# Patient Record
Sex: MALE | Race: WHITE | NOT HISPANIC OR LATINO | Employment: FULL TIME | URBAN - METROPOLITAN AREA
[De-identification: names, ages, dates, MRNs, and addresses within clinical notes are randomized per-mention and may not be internally consistent; named-entity substitution may affect disease eponyms.]

---

## 2017-01-06 ENCOUNTER — OFFICE VISIT (OUTPATIENT)
Dept: URGENT CARE | Facility: CLINIC | Age: 37
End: 2017-01-06
Payer: COMMERCIAL

## 2017-01-06 VITALS
SYSTOLIC BLOOD PRESSURE: 120 MMHG | HEIGHT: 69 IN | WEIGHT: 180 LBS | BODY MASS INDEX: 26.66 KG/M2 | RESPIRATION RATE: 16 BRPM | HEART RATE: 82 BPM | DIASTOLIC BLOOD PRESSURE: 66 MMHG | TEMPERATURE: 99 F | OXYGEN SATURATION: 97 %

## 2017-01-06 DIAGNOSIS — H10.32 ACUTE BACTERIAL CONJUNCTIVITIS OF LEFT EYE: ICD-10-CM

## 2017-01-06 PROCEDURE — 99203 OFFICE O/P NEW LOW 30 MIN: CPT | Performed by: FAMILY MEDICINE

## 2017-01-06 RX ORDER — CIPROFLOXACIN HYDROCHLORIDE 3.5 MG/ML
1 SOLUTION/ DROPS TOPICAL 4 TIMES DAILY
Qty: 1 BOTTLE | Refills: 0 | Status: SHIPPED | OUTPATIENT
Start: 2017-01-06 | End: 2017-01-13

## 2017-01-06 NOTE — MR AVS SNAPSHOT
"        Tristen Palmer   2017 9:15 AM   Office Visit   MRN: [id number]    Department:  [facility]   Dept Phone:  [phone]    Description:  Male : 1980   Provider:  Meme Smallwood D.O.           Reason for Visit     Eye Problem left eye redness, drainage x 2 days      Allergies as of 2017     No Known Allergies      You were diagnosed with     Acute bacterial conjunctivitis of left eye   [[id number]]         Vital Signs     Blood Pressure Pulse Temperature Respirations Height Weight    120/66 mmHg 82 37.2 °C (99 °F) 16 1.753 m (5' 9.02\") 81.647 kg (180 lb)    Body Mass Index Oxygen Saturation Smoking Status             26.57 kg/m2 97% Never Smoker          Basic Information     Date Of Birth Sex Race Ethnicity Preferred Language    1980 Male White Non- English      Your appointments     2017 11:00 AM   New Patient with Benito Sales PA-C   [facility])    [address]  [address]  [place] [address]   [phone]           Please bring Photo ID, Insurance Cards, All Medication Bottles and copies of any legal documents (such as Living Will, Power of ) If speaking a language besides English please bring an adult . Please arrive 30 minutes prior for check in and registration. You will be receiving a confirmation call a few days before your appointment from our automated call confirmation system.              Health Maintenance     Patient has no pending health maintenance at this time      Current Immunizations     No immunizations on file.      Below and/or attached are the medications your provider expects you to take. Review all of your home medications and newly ordered medications with your provider and/or pharmacist. Follow medication instructions as directed by your provider and/or pharmacist. Please keep your medication list with you and share with your provider. Update the information when " medications are discontinued, doses are changed, or new medications (including over-the-counter products) are added; and carry medication information at all times in the event of emergency situations     Allergies:  No Known Allergies          Medications  Valid as of: January 06, 2017 -  9:51 AM    Generic Name Brand Name Tablet Size Instructions for use    Ciprofloxacin HCl (Solution) CILOXIN 0.3 % Place 1 Drop in both eyes 4 times a day for 7 days.        .                 Medicines prescribed today were sent to:     [facility][id number] - [place], [place] - [address]    [address] [place] [address]    Phone: [phone] Fax: [phone]    Open 24 Hours?: No      Medication refill instructions:       If your prescription bottle indicates you have medication refills left, it is not necessary to call your provider’s office. Please contact your pharmacy and they will refill your medication.    If your prescription bottle indicates you do not have any refills left, you may request refills at any time through one of the following ways: The online Ortho-tag system (except Urgent Care), by calling your provider’s office, or by asking your pharmacy to contact your provider’s office with a refill request. Medication refills are processed only during regular business hours and may not be available until the next business day. Your provider may request additional information or to have a follow-up visit with you prior to refilling your medication.   *Please Note: Medication refills are assigned a new Rx number when refilled electronically. Your pharmacy may indicate that no refills were authorized even though a new prescription for the same medication is available at the pharmacy. Please request the medicine by name with the pharmacy before contacting your provider for a refill.           Ortho-tag Access Code: Activation code not generated  Current Ortho-tag Status: Active

## 2017-01-06 NOTE — PROGRESS NOTES
"HPI: Tristen Palmer is a 36 y.o. male who presents with   Chief Complaint   Patient presents with   • Eye Problem     left eye redness, drainage x 2 days      Patient has had 2 days of left eye redness and mucous discharge. He denies any trauma does not wear contact lenses. Denies any significant cough or nasal congestion no fevers. Denies any visual disturbances.  Worsened by: activity, laying supine at night, first thing in the morning, when exposed to outside allergens  Improved by: OTC symptomatic medictions      PMH:  has no past medical history on file.  MEDS:   Current outpatient prescriptions:   •  ciprofloxacin (CILOXIN) 0.3 % Solution, Place 1 Drop in both eyes 4 times a day for 7 days., Disp: 1 Bottle, Rfl: 0  ALLERGIES: No Known Allergies  SURGHX: No past surgical history on file.  SOCHX:    FH: Family history was reviewed, no pertinent findings to report    PE:  Vitals /66 mmHg  Pulse 82  Temp(Src) 37.2 °C (99 °F)  Resp 16  Ht 1.753 m (5' 9.02\")  Wt 81.647 kg (180 lb)  BMI 26.57 kg/m2  SpO2 97%   Gen AOx4, NAD  HEENT: moist mucus membranes, no pain or pressure with percussion of frontal, maxillary or ethmoid sinuses.  Left conjunciva with erythema and d/c right clear without erythema or exudate,  Bilateral TM's clear without bulge, fluid or loss of landmarks, no pharyngeal erythema or tonsillar exudate or tonsillar enlargement  Neck: supple, no cervical lymphadenopathy, no signs of menigismus  CV/PULM: RRR no murmurs, no rales ronchi or wheezes, no signs of resp distress  Abd soft nontender, bs present  Skin no rashes  Extremities -c/c/e  Neuro appropriate affect,       A/P  The encounter diagnosis was Acute bacterial conjunctivitis of left eye.  I am having Mr. Palmer start on ciprofloxacin.     "

## 2017-01-27 ENCOUNTER — APPOINTMENT (OUTPATIENT)
Dept: MEDICAL GROUP | Facility: MEDICAL CENTER | Age: 37
End: 2017-01-27
Payer: COMMERCIAL

## 2017-02-10 ENCOUNTER — OFFICE VISIT (OUTPATIENT)
Dept: MEDICAL GROUP | Facility: MEDICAL CENTER | Age: 37
End: 2017-02-10
Payer: COMMERCIAL

## 2017-02-10 VITALS
DIASTOLIC BLOOD PRESSURE: 76 MMHG | TEMPERATURE: 98.4 F | HEIGHT: 68 IN | RESPIRATION RATE: 14 BRPM | HEART RATE: 68 BPM | BODY MASS INDEX: 27.34 KG/M2 | OXYGEN SATURATION: 96 % | SYSTOLIC BLOOD PRESSURE: 110 MMHG | WEIGHT: 180.4 LBS

## 2017-02-10 DIAGNOSIS — R53.83 OTHER FATIGUE: ICD-10-CM

## 2017-02-10 DIAGNOSIS — Z00.00 PREVENTATIVE HEALTH CARE: ICD-10-CM

## 2017-02-10 DIAGNOSIS — F33.8 SEASONAL AFFECTIVE DISORDER (HCC): ICD-10-CM

## 2017-02-10 DIAGNOSIS — R39.15 URINARY URGENCY: ICD-10-CM

## 2017-02-10 DIAGNOSIS — A63.0 GENITAL WARTS: ICD-10-CM

## 2017-02-10 DIAGNOSIS — B35.1 ONYCHOMYCOSIS: ICD-10-CM

## 2017-02-10 PROCEDURE — 99214 OFFICE O/P EST MOD 30 MIN: CPT | Performed by: PHYSICIAN ASSISTANT

## 2017-02-10 RX ORDER — IMIQUIMOD 12.5 MG/.25G
CREAM TOPICAL
Qty: 1 EACH | Refills: 2 | Status: SHIPPED | OUTPATIENT
Start: 2017-02-10 | End: 2017-06-05

## 2017-02-10 ASSESSMENT — PATIENT HEALTH QUESTIONNAIRE - PHQ9: CLINICAL INTERPRETATION OF PHQ2 SCORE: 1

## 2017-02-11 NOTE — ASSESSMENT & PLAN NOTE
He complains of episodic depression surrounding the winter months. He may go a week through depression. He won't feel like doing anything. His wife notices it. She recently told him to start vitamin D. He has taken 2 tablets of 5000 units. Denies any homicidal or suicidal ideations.

## 2017-02-11 NOTE — ASSESSMENT & PLAN NOTE
Complains of a one year history of noticing 2 lesions on his shaft of the penis. No lesions are not painful. Denies any penile discharge. States has been  for 5 years. He's been faithful to his wife. Denies knowing his wife has any symptoms.

## 2017-02-11 NOTE — ASSESSMENT & PLAN NOTE
This is a 36-year-old male comes in today to establish care. He has a long history of fungal infection of his toenails. Denies pain. A few years ago he declined treatment and regrets it. His dad might of had onychomycosis.

## 2017-02-11 NOTE — PROGRESS NOTES
Subjective:   Tristen Palmer is a 36 y.o. male here today to establish care and discuss several medical complaints.    Onychomycosis  This is a 36-year-old male comes in today to establish care. He has a long history of fungal infection of his toenails. Denies pain. A few years ago he declined treatment and regrets it. His dad might of had onychomycosis.    Seasonal affective disorder (CMS-[facility])  He complains of episodic depression surrounding the winter months. He may go a week through depression. He won't feel like doing anything. His wife notices it. She recently told him to start vitamin D. He has taken 2 tablets of 5000 units. Denies any homicidal or suicidal ideations.    Urinary urgency  Over the past few months he's noticed that when he has to use the restroom has to use it right away. Used to be able to control his bladder. He denies any hematuria. Denies any dysuria. No dribbling or nocturia. No polyuria. No polydipsia.    Recently began working out with a . Also has a high protein diet.  has lost 30 pounds over the past few weeks.    Genital warts  Complains of a one year history of noticing 2 lesions on his shaft of the penis. No lesions are not painful. Denies any penile discharge.  has been  for 5 years. He's been faithful to his wife. Denies knowing his wife has any symptoms.    Fatigue  Complains of slight fatigue that may be secondary to vitamin D deficiency.       Current medicines (including changes today)  Current Outpatient Prescriptions   Medication Sig Dispense Refill   • vitamin D (CHOLECALCIFEROL) 1000 UNIT Tab Take 1,000 Units by mouth every day.     • imiquimod (ALDARA) 5 % cream Apply cream thinly to area 3x weekly on alternative days and leave on 6-10 hrs.  Wash off after.  Use until disappears or until 16 wks. 1 Each 2     No current facility-administered medications for this visit.     He  has no past medical history on file.    ROS   No chest pain, no  "shortness of breath, no abdominal pain and all other systems were reviewed and are negative.       Objective:     Blood pressure 110/76, pulse 68, temperature 36.9 °C (98.4 °F), resp. rate 14, height 1.721 m (5' 7.75\"), weight 81.829 kg (180 lb 6.4 oz), SpO2 96 %. Body mass index is 27.63 kg/(m^2).   Physical Exam:  Constitutional: Alert, no distress.  Skin: Warm, dry, good turgor, no rashes in visible areas, on the great toenails and a portion of the second toenail of the right foot there is thickening of the nail bed. Not across the entire nail. On the penis shaft there are 2 raised fleshy colored lesions circular nature. Approximately 2 mm in diameter..  Eye: Equal, round and reactive, conjunctiva clear, lids normal.  ENMT: Lips without lesions, good dentition, oropharynx clear.  Neck: Trachea midline, no masses.   Lymph: No cervical or supraclavicular lymphadenopathy  Respiratory: Unlabored respiratory effort, lungs appear clear.  Cardiovascular: Regular rate and rhythm  : Circumcised male, please see description of lesions above.  Psych: Alert and oriented x3, normal affect and mood.        Assessment and Plan:   The following treatment plan was discussed    1. Onychomycosis  Chronic condition. Discussed with antifungal medications possibly causing liver damage. He does consume alcohol. Currently he declined referral to see podiatry. Advised him that if he has any change of his mind I will refer him to podiatry for the 12 week treatment course.    2. Urinary urgency  Chronic condition. Advised to continue to exercise. This may be secondary to caffeine intake. We'll perform urinalysis.  - URINALYSIS, COMPLETE    3. Seasonal affective disorder (CMS-HCC)  Chronic condition. Stable. Offered referral to behavioral health. He declined.  - VITAMIN 1,25 DIHYDROXY; Future    4. Other fatigue  We'll check vitamin D level.    5. Genital warts  New-onset condition. Prescribed Aldara as directed. Refer to dermatology. " Advised him it would not be seen dermatology for several months.  - REFERRAL TO DERMATOLOGY    6. Preventative health care  Ordered fasting labs. He will be contacted.  - CBC WITHOUT DIFFERENTIAL  - COMP METABOLIC PANEL; Future  - HEMOGLOBIN A1C; Future  - LIPID PANEL  - VITAMIN 1,25 DIHYDROXY; Future      Followup: Return if symptoms worsen or fail to improve.    Please note that this dictation was created using voice recognition software. I have made every reasonable attempt to correct obvious errors, but I expect that there are errors of grammar and possibly content that I did not discover before finalizing the note.

## 2017-02-11 NOTE — ASSESSMENT & PLAN NOTE
Over the past few months he's noticed that when he has to use the restroom has to use it right away. Used to be able to control his bladder. He denies any hematuria. Denies any dysuria. No dribbling or nocturia. No polyuria. No polydipsia.    Recently began working out with a . Also has a high protein diet. States has lost 30 pounds over the past few weeks.

## 2017-02-17 ENCOUNTER — APPOINTMENT (OUTPATIENT)
Dept: LAB | Facility: MEDICAL CENTER | Age: 37
End: 2017-02-17
Payer: COMMERCIAL

## 2017-02-24 ENCOUNTER — HOSPITAL ENCOUNTER (OUTPATIENT)
Dept: LAB | Facility: MEDICAL CENTER | Age: 37
End: 2017-02-24
Attending: PHYSICIAN ASSISTANT
Payer: COMMERCIAL

## 2017-02-24 DIAGNOSIS — F33.8 SEASONAL AFFECTIVE DISORDER (HCC): ICD-10-CM

## 2017-02-24 DIAGNOSIS — Z00.00 PREVENTATIVE HEALTH CARE: ICD-10-CM

## 2017-02-24 LAB
ALBUMIN SERPL BCP-MCNC: 4.8 G/DL (ref 3.2–4.9)
ALBUMIN/GLOB SERPL: 1.5 G/DL
ALP SERPL-CCNC: 57 U/L (ref 30–99)
ALT SERPL-CCNC: 22 U/L (ref 2–50)
ANION GAP SERPL CALC-SCNC: 6 MMOL/L (ref 0–11.9)
APPEARANCE UR: CLEAR
AST SERPL-CCNC: 17 U/L (ref 12–45)
BASOPHILS # BLD AUTO: 0.03 K/UL (ref 0–0.12)
BASOPHILS NFR BLD AUTO: 0.7 % (ref 0–1.8)
BILIRUB SERPL-MCNC: 0.6 MG/DL (ref 0.1–1.5)
BILIRUB UR QL STRIP.AUTO: NEGATIVE
BUN SERPL-MCNC: 20 MG/DL (ref 8–22)
CALCIUM SERPL-MCNC: 10.2 MG/DL (ref 8.5–10.5)
CHLORIDE SERPL-SCNC: 102 MMOL/L (ref 96–112)
CHOLEST SERPL-MCNC: 272 MG/DL (ref 100–199)
CO2 SERPL-SCNC: 31 MMOL/L (ref 20–33)
COLOR UR AUTO: NORMAL
CREAT SERPL-MCNC: 1.11 MG/DL (ref 0.5–1.4)
EOSINOPHIL # BLD: 0.06 K/UL (ref 0–0.51)
EOSINOPHIL NFR BLD AUTO: 1.4 % (ref 0–6.9)
ERYTHROCYTE [DISTWIDTH] IN BLOOD BY AUTOMATED COUNT: 40.8 FL (ref 35.9–50)
EST. AVERAGE GLUCOSE BLD GHB EST-MCNC: 105 MG/DL
GLOBULIN SER CALC-MCNC: 3.1 G/DL (ref 1.9–3.5)
GLUCOSE SERPL-MCNC: 89 MG/DL (ref 65–99)
GLUCOSE UR STRIP.AUTO-MCNC: NEGATIVE MG/DL
HBA1C MFR BLD: 5.3 % (ref 0–5.6)
HCT VFR BLD AUTO: 47.1 % (ref 42–52)
HDLC SERPL-MCNC: 60 MG/DL
HGB BLD-MCNC: 15.5 G/DL (ref 14–18)
IMM GRANULOCYTES # BLD AUTO: 0 K/UL (ref 0–0.11)
IMM GRANULOCYTES NFR BLD AUTO: 0 % (ref 0–0.9)
KETONES UR STRIP.AUTO-MCNC: NEGATIVE MG/DL
LDLC SERPL CALC-MCNC: 181 MG/DL
LEUKOCYTE ESTERASE UR QL STRIP.AUTO: NEGATIVE
LYMPHOCYTES # BLD: 1.3 K/UL (ref 1–4.8)
LYMPHOCYTES NFR BLD AUTO: 30.3 % (ref 22–41)
MCH RBC QN AUTO: 29.5 PG (ref 27–33)
MCHC RBC AUTO-ENTMCNC: 32.9 G/DL (ref 33.7–35.3)
MCV RBC AUTO: 89.5 FL (ref 81.4–97.8)
MICRO URNS: NORMAL
MONOCYTES # BLD: 0.46 K/UL (ref 0–0.85)
MONOCYTES NFR BLD AUTO: 10.7 % (ref 0–13.4)
NEUTROPHILS # BLD: 2.44 K/UL (ref 1.82–7.42)
NEUTROPHILS NFR BLD AUTO: 56.9 % (ref 44–72)
NITRITE UR QL STRIP.AUTO: NEGATIVE
NRBC # BLD AUTO: 0 K/UL
NRBC BLD-RTO: 0 /100 WBC
PH UR: 6.5 [PH]
PLATELET # BLD AUTO: 241 K/UL (ref 164–446)
PMV BLD AUTO: 8.9 FL (ref 9–12.9)
POTASSIUM SERPL-SCNC: 4.1 MMOL/L (ref 3.6–5.5)
PROT SERPL-MCNC: 7.9 G/DL (ref 6–8.2)
PROT UR QL STRIP: NEGATIVE MG/DL
RBC # BLD AUTO: 5.26 M/UL (ref 4.7–6.1)
RBC UR QL AUTO: NEGATIVE
SODIUM SERPL-SCNC: 139 MMOL/L (ref 135–145)
SP GR UR STRIP.AUTO: 1.01
TRIGL SERPL-MCNC: 156 MG/DL (ref 0–149)
WBC # BLD AUTO: 4.3 K/UL (ref 4.8–10.8)

## 2017-02-24 PROCEDURE — 81003 URINALYSIS AUTO W/O SCOPE: CPT

## 2017-02-24 PROCEDURE — 36415 COLL VENOUS BLD VENIPUNCTURE: CPT

## 2017-02-24 PROCEDURE — 85025 COMPLETE CBC W/AUTO DIFF WBC: CPT

## 2017-02-24 PROCEDURE — 80061 LIPID PANEL: CPT

## 2017-02-24 PROCEDURE — 80053 COMPREHEN METABOLIC PANEL: CPT

## 2017-02-24 PROCEDURE — 83036 HEMOGLOBIN GLYCOSYLATED A1C: CPT

## 2017-02-24 PROCEDURE — 82652 VIT D 1 25-DIHYDROXY: CPT

## 2017-02-27 ENCOUNTER — TELEPHONE (OUTPATIENT)
Dept: MEDICAL GROUP | Facility: MEDICAL CENTER | Age: 37
End: 2017-02-27

## 2017-02-27 LAB — 1,25(OH)2D3 SERPL-MCNC: 56.3 PG/ML (ref 19.9–79.3)

## 2017-02-28 NOTE — TELEPHONE ENCOUNTER
----- Message from Benito Sales PA-C sent at 2/27/2017  4:10 PM PST -----  Please contact Tristen.  Cholesterol is very high.  He should be on medication.  If he wants to eat healthier and exercise we may recheck in 2-3 months otherwise he'll need to start cholesterol medication. Give him those options.  Thank you.    Benito

## 2017-02-28 NOTE — TELEPHONE ENCOUNTER
Phone Number Called: [phone] (home)     Message: Left message for patient to call back regarding lab results.    Left Message for patient to call back: yes

## 2017-03-01 NOTE — TELEPHONE ENCOUNTER
Phone Number Called: [phone]     Message: Left message for patient to call back regarding lab results    Left Message for patient to call back: yes and no

## 2017-03-06 NOTE — TELEPHONE ENCOUNTER
Phone Number Called: [phone]     Message: Left message for patient about lab work.     Left Message for patient to call back: N\A

## 2017-06-05 ENCOUNTER — OFFICE VISIT (OUTPATIENT)
Dept: MEDICAL GROUP | Facility: MEDICAL CENTER | Age: 37
End: 2017-06-05
Payer: COMMERCIAL

## 2017-06-05 VITALS
TEMPERATURE: 98.5 F | DIASTOLIC BLOOD PRESSURE: 74 MMHG | HEART RATE: 88 BPM | WEIGHT: 182.1 LBS | HEIGHT: 68 IN | SYSTOLIC BLOOD PRESSURE: 120 MMHG | BODY MASS INDEX: 27.6 KG/M2 | RESPIRATION RATE: 16 BRPM | OXYGEN SATURATION: 97 %

## 2017-06-05 DIAGNOSIS — E78.00 PURE HYPERCHOLESTEROLEMIA: ICD-10-CM

## 2017-06-05 DIAGNOSIS — A63.0 GENITAL WARTS: ICD-10-CM

## 2017-06-05 DIAGNOSIS — D22.9 ATYPICAL NEVUS: ICD-10-CM

## 2017-06-05 DIAGNOSIS — F33.8 SEASONAL AFFECTIVE DISORDER (HCC): ICD-10-CM

## 2017-06-05 PROBLEM — R39.15 URINARY URGENCY: Status: RESOLVED | Noted: 2017-02-10 | Resolved: 2017-06-05

## 2017-06-05 PROBLEM — R53.83 FATIGUE: Status: RESOLVED | Noted: 2017-02-10 | Resolved: 2017-06-05

## 2017-06-05 PROCEDURE — 99214 OFFICE O/P EST MOD 30 MIN: CPT | Performed by: PHYSICIAN ASSISTANT

## 2017-06-05 NOTE — ASSESSMENT & PLAN NOTE
He has been followed by dermatology. Aldara was not effective. It one treatment has been helpful in reduction of the size of the warts. He returns to dermatology soon.

## 2017-06-05 NOTE — ASSESSMENT & PLAN NOTE
He continues to have issues with not necessarily seasonal affective disorder but just a general mood. He has moved twice since moving to [place]. He has had 2 jobs. His job does have a high amount of stress. Denies any history of homicidal or suicidal ideations. Has no interest in speaking with someone about his emotions.

## 2017-06-05 NOTE — PROGRESS NOTES
Subjective:   Tristen Gardner is a 37 y.o. male here today for follow-up on labs as well as to discuss his other medical conditions a new lesion on his head.    Pure hypercholesterolemia  This is a 37 show male comes in today to discuss his recent labs performed in February. Cholesterol values were as followed:    Results for TRISTEN GARDNER (MRN [id number]) as of 6/5/2017 09:34   Ref. Range 2/24/2017 09:33   Cholesterol,Tot Latest Ref Range: 100-199 mg/dL 272 (H)   Triglycerides Latest Ref Range: 0-149 mg/dL 156 (H)   HDL Latest Ref Range: >=40 mg/dL 60   LDL Latest Ref Range: <100 mg/dL 181 (H)       Since notification he has given up eating bison daily. He has cut back in red meat. He wants to have his labs performed again. There is no family history of elevated cholesterol.    Genital warts  He has been followed by dermatology. Aldara was not effective. It one treatment has been helpful in reduction of the size of the warts. He returns to dermatology soon.    Atypical nevus  Has noticed for the past couple weeks the lesion on his head. His head is shaved.    Seasonal affective disorder (CMS-HCC)  He continues to have issues with not necessarily seasonal affective disorder but just a general mood. He has moved twice since moving to [place]. He has had 2 jobs. His job does have a high amount of stress. Denies any history of homicidal or suicidal ideations. Has no interest in speaking with someone about his emotions.         Current medicines (including changes today)  Current Outpatient Prescriptions   Medication Sig Dispense Refill   • Melatonin 1 MG Cap Take  by mouth.     • vitamin D (CHOLECALCIFEROL) 1000 UNIT Tab Take 1,000 Units by mouth every day.       No current facility-administered medications for this visit.     He  has no past medical history on file.    ROS   No chest pain, no shortness of breath, no abdominal pain and all other systems were reviewed and are negative.       Objective:     Blood  "pressure 120/74, pulse 88, temperature 36.9 °C (98.5 °F), resp. rate 16, height 1.721 m (5' 7.75\"), weight 82.6 kg (182 lb 1.6 oz), SpO2 97 %. Body mass index is 27.89 kg/(m^2).   Physical Exam:  Constitutional: Alert, no distress.  Skin: Warm, dry, good turgor, no rashes in visible areas. On the left side of his head was at apex there is a raised approximately 2 mm lesion. No telangiectasia. No hyperpigmentation.  Eye: Equal, round and reactive, conjunctiva clear, lids normal.  ENMT: Lips without lesions, good dentition, oropharynx clear.  Neck: Trachea midline, no masses.   Lymph: No cervical or supraclavicular lymphadenopathy  Respiratory: Unlabored respiratory effort, lungs appear clear, no wheezes.  Cardiovascular: Normal S1, S2, no murmur, no edema.  Psych: Alert and oriented x3, normal affect and mood.        Assessment and Plan:   The following treatment plan was discussed    1. Pure hypercholesterolemia  Acute, new onset condition. We'll repeat lipid profile. Advised if cholesterol still elevated to cut out all fatty foods. Exercise more performing cardiovascular activities. Patient is hesitant to go on oral medications. May try supplements such as red rice yeast or CholestOff.  - LIPID PROFILE; Future    2. Atypical nevus, head.  Acute, new onset condition. He has an appointment soon with dermatology. Advised to have dermatology look at the lesion. May need to be excised.    3. Genital warts  Chronic condition. Follow up with dermatology.    4. Seasonal affective disorder (CMS-HCC)  Chronic condition. Referred to behavioral health for further evaluation.  - REFERRAL TO BEHAVIORAL HEALTH      Followup: Return if symptoms worsen or fail to improve.    Please note that this dictation was created using voice recognition software. I have made every reasonable attempt to correct obvious errors, but I expect that there are errors of grammar and possibly content that I did not discover before finalizing the " note.

## 2017-06-05 NOTE — ASSESSMENT & PLAN NOTE
This is a 37 show male comes in today to discuss his recent labs performed in February. Cholesterol values were as followed:    Results for ARNAUD GARDNER (MRN [id number]) as of 6/5/2017 09:34   Ref. Range 2/24/2017 09:33   Cholesterol,Tot Latest Ref Range: 100-199 mg/dL 272 (H)   Triglycerides Latest Ref Range: 0-149 mg/dL 156 (H)   HDL Latest Ref Range: >=40 mg/dL 60   LDL Latest Ref Range: <100 mg/dL 181 (H)       Since notification he has given up eating bison daily. He has cut back in red meat. He wants to have his labs performed again. There is no family history of elevated cholesterol.

## 2017-06-12 ENCOUNTER — HOSPITAL ENCOUNTER (OUTPATIENT)
Dept: LAB | Facility: MEDICAL CENTER | Age: 37
End: 2017-06-12
Attending: PHYSICIAN ASSISTANT
Payer: COMMERCIAL

## 2017-06-12 DIAGNOSIS — E78.00 PURE HYPERCHOLESTEROLEMIA: ICD-10-CM

## 2017-06-12 LAB
APPEARANCE UR: CLEAR
BILIRUB UR QL STRIP.AUTO: NEGATIVE
CHOLEST SERPL-MCNC: 252 MG/DL (ref 100–199)
COLOR UR: NORMAL
ERYTHROCYTE [DISTWIDTH] IN BLOOD BY AUTOMATED COUNT: 40.7 FL (ref 35.9–50)
GLUCOSE UR STRIP.AUTO-MCNC: NEGATIVE MG/DL
HCT VFR BLD AUTO: 46.7 % (ref 42–52)
HDLC SERPL-MCNC: 51 MG/DL
HGB BLD-MCNC: 15.3 G/DL (ref 14–18)
KETONES UR STRIP.AUTO-MCNC: NEGATIVE MG/DL
LDLC SERPL CALC-MCNC: 152 MG/DL
LEUKOCYTE ESTERASE UR QL STRIP.AUTO: NEGATIVE
MCH RBC QN AUTO: 29.3 PG (ref 27–33)
MCHC RBC AUTO-ENTMCNC: 32.8 G/DL (ref 33.7–35.3)
MCV RBC AUTO: 89.5 FL (ref 81.4–97.8)
MICRO URNS: NORMAL
NITRITE UR QL STRIP.AUTO: NEGATIVE
PH UR STRIP.AUTO: 8 [PH]
PLATELET # BLD AUTO: 318 K/UL (ref 164–446)
PMV BLD AUTO: 9.2 FL (ref 9–12.9)
PROT UR QL STRIP: NEGATIVE MG/DL
RBC # BLD AUTO: 5.22 M/UL (ref 4.7–6.1)
RBC UR QL AUTO: NEGATIVE
SP GR UR STRIP.AUTO: 1.01
TRIGL SERPL-MCNC: 247 MG/DL (ref 0–149)
WBC # BLD AUTO: 4.9 K/UL (ref 4.8–10.8)

## 2017-06-12 PROCEDURE — 81003 URINALYSIS AUTO W/O SCOPE: CPT

## 2017-06-12 PROCEDURE — 36415 COLL VENOUS BLD VENIPUNCTURE: CPT

## 2017-06-12 PROCEDURE — 80061 LIPID PANEL: CPT

## 2017-06-12 PROCEDURE — 85027 COMPLETE CBC AUTOMATED: CPT

## 2017-06-13 ENCOUNTER — TELEPHONE (OUTPATIENT)
Dept: MEDICAL GROUP | Facility: MEDICAL CENTER | Age: 37
End: 2017-06-13

## 2017-06-13 NOTE — TELEPHONE ENCOUNTER
----- Message from Benito Sales PA-C sent at 6/12/2017  5:16 PM PDT -----  Please contact Tristen. Cholesterol did improve but not to the point where I feel comfortable not treating. I did tell him to exercise and cut out all fatty foods for the next couple months and maybe take an over-the-counter supplement and we will repeat it. So I will put an order in the labs. Also the white blood cell count normalized. Thank you.    Benito

## 2017-06-13 NOTE — TELEPHONE ENCOUNTER
Phone Number Called: [phone] (home)    Message: Notified patient of the results and he stated understanding.    Left Message for patient to call back: no

## 2017-10-12 ENCOUNTER — OFFICE VISIT (OUTPATIENT)
Dept: MEDICAL GROUP | Facility: MEDICAL CENTER | Age: 37
End: 2017-10-12
Payer: COMMERCIAL

## 2017-10-12 VITALS
HEART RATE: 66 BPM | DIASTOLIC BLOOD PRESSURE: 70 MMHG | RESPIRATION RATE: 14 BRPM | TEMPERATURE: 99 F | BODY MASS INDEX: 27.61 KG/M2 | SYSTOLIC BLOOD PRESSURE: 118 MMHG | HEIGHT: 68 IN | OXYGEN SATURATION: 96 % | WEIGHT: 182.2 LBS

## 2017-10-12 DIAGNOSIS — E78.00 PURE HYPERCHOLESTEROLEMIA: ICD-10-CM

## 2017-10-12 DIAGNOSIS — A63.0 GENITAL WARTS: ICD-10-CM

## 2017-10-12 DIAGNOSIS — F51.01 PRIMARY INSOMNIA: ICD-10-CM

## 2017-10-12 PROBLEM — Z00.00 PREVENTATIVE HEALTH CARE: Status: RESOLVED | Noted: 2017-02-10 | Resolved: 2017-10-12

## 2017-10-12 PROCEDURE — 99213 OFFICE O/P EST LOW 20 MIN: CPT | Performed by: PHYSICIAN ASSISTANT

## 2017-10-12 NOTE — ASSESSMENT & PLAN NOTE
He saw dermatology and eventually had them burned off. Does have some follow-up appointments but they have improved tremendously.

## 2017-10-12 NOTE — PROGRESS NOTES
"Subjective:   Tristen Palmer is a 37 y.o. male here today forDiscussion of a history for greater than one month of insomnia and for his history of hyperlipidemia.    Primary insomnia  This is a 37-year-old male who is here today to discuss insomnia issues he's been having. He states that he will hardly sleep because of his issues. Denies any specific triggers except for stresses. He will take NyQuil with good results. Also recently started melatonin at 5 units but it is not effective. He wants to discuss possible cannabis use for insomnia.    Pure hypercholesterolemia  He has been exercising more routinely. Running. Completely erased meat and eggs from his diet. Recent lipid profile in June showed his triglycerides being elevated in the 240s. LDL in the low 150s. There was improvement from his previous cholesterol profile except for triglycerides. He wants to do his labs again next week.    Genital warts  He saw dermatology and eventually had them burned off. Does have some follow-up appointments but they have improved tremendously.       Current medicines (including changes today)  Current Outpatient Prescriptions   Medication Sig Dispense Refill   • Melatonin 1 MG Cap Take  by mouth.       No current facility-administered medications for this visit.      He  has no past medical history on file.    ROS   No chest pain, no shortness of breath, no abdominal pain and all other systems were reviewed and are negative.       Objective:     Blood pressure 118/70, pulse 66, temperature 37.2 °C (99 °F), resp. rate 14, height 1.721 m (5' 7.75\"), weight 82.6 kg (182 lb 3.2 oz), SpO2 96 %. Body mass index is 27.91 kg/m².   Physical Exam:  Constitutional: Alert, no distress.  Skin: Warm, dry, good turgor, no rashes in visible areas.  Eye: Equal, round and reactive, conjunctiva clear, lids normal.  ENMT: Lips without lesions, good dentition, oropharynx clear.  Neck: Trachea midline, no masses.   Lymph: No cervical or " supraclavicular lymphadenopathy  Respiratory: Unlabored respiratory effort, lungs appear clear, no wheezes.  Cardiovascular: Regular rate and rhythm.   Psych: Alert and oriented x3, normal affect and mood.        Assessment and Plan:   The following treatment plan was discussed    1. Primary insomnia  New-onset condition. Advised increased dose of melatonin to 10 units nightly. If not effective take Benadryl 25-50 mg prior to bedtime. Advised that do not have any insight on cannabis use for insomnia but have patient set of used it effectively. Advised there are risks associated with taking it. May contact me with any concerns or questions.    2. Pure hypercholesterolemia  Chronic condition. Repeat lipid profile next week. Continue exercising routinely and eating healthier. We'll discuss medication once these labs have been performed.    3. Genital warts  Chronic condition. Currently resolved status post cryotherapy. Continue to follow-up with dermatology.      Followup: No Follow-up on file.    Please note that this dictation was created using voice recognition software. I have made every reasonable attempt to correct obvious errors, but I expect that there are errors of grammar and possibly content that I did not discover before finalizing the note.

## 2017-10-12 NOTE — ASSESSMENT & PLAN NOTE
This is a 37-year-old male who is here today to discuss insomnia issues he's been having. He states that he will hardly sleep because of his issues. Denies any specific triggers except for stresses. He will take NyQuil with good results. Also recently started melatonin at 5 units but it is not effective. He wants to discuss possible cannabis use for insomnia.

## 2017-10-12 NOTE — ASSESSMENT & PLAN NOTE
He has been exercising more routinely. Running. Completely erased meat and eggs from his diet. Recent lipid profile in June showed his triglycerides being elevated in the 240s. LDL in the low 150s. There was improvement from his previous cholesterol profile except for triglycerides. He wants to do his labs again next week.

## 2018-05-24 ENCOUNTER — TELEPHONE (OUTPATIENT)
Dept: MEDICAL GROUP | Facility: MEDICAL CENTER | Age: 38
End: 2018-05-24

## 2018-05-24 NOTE — TELEPHONE ENCOUNTER
ESTABLISHED PATIENT PRE-VISIT PLANNING     Note: Patient will not be contacted if there is no indication to call.     1.  Reviewed notes from the last few office visits within the medical group: Yes 10/12/2017    2.  If any orders were placed at last visit or intended to be done for this visit (i.e. 6 mos follow-up), do we have Results/Consult Notes?        •  Labs - Labs were not ordered at last office visit.   Note: If patient appointment is for lab review and patient did not complete labs, check with provider if OK to reschedule patient until labs completed.       •  Imaging - Imaging was not ordered at last office visit.       •  Referrals - No referrals were ordered at last office visit.    3. Is this appointment scheduled as a Hospital Follow-Up? No    4.  Immunizations were updated in Epic using WebIZ?: No WebIZ record       •  Web Iz Recommendations: n/a    5.  Patient is due for the following Health Maintenance Topics:   Health Maintenance Due   Topic Date Due   • IMM DTaP/Tdap/Td Vaccine (1 - Tdap) 03/09/2000       6.  MDX printed for Provider? NO    7.  Patient was NOT informed to arrive 15 min prior to their scheduled appointment and bring in their medication bottles.

## 2018-05-25 ENCOUNTER — OFFICE VISIT (OUTPATIENT)
Dept: URGENT CARE | Facility: CLINIC | Age: 38
End: 2018-05-25
Payer: COMMERCIAL

## 2018-05-25 VITALS
WEIGHT: 190 LBS | OXYGEN SATURATION: 97 % | HEART RATE: 72 BPM | HEIGHT: 68 IN | RESPIRATION RATE: 16 BRPM | TEMPERATURE: 98.8 F | DIASTOLIC BLOOD PRESSURE: 70 MMHG | SYSTOLIC BLOOD PRESSURE: 120 MMHG | BODY MASS INDEX: 28.79 KG/M2

## 2018-05-25 DIAGNOSIS — B37.2 CANDIDAL INTERTRIGO: ICD-10-CM

## 2018-05-25 PROCEDURE — 99204 OFFICE O/P NEW MOD 45 MIN: CPT | Performed by: PHYSICIAN ASSISTANT

## 2018-05-25 RX ORDER — NYSTATIN 100000 [USP'U]/G
POWDER TOPICAL
Qty: 1 BOTTLE | Refills: 0 | Status: SHIPPED | OUTPATIENT
Start: 2018-05-25 | End: 2018-05-31

## 2018-05-25 ASSESSMENT — ENCOUNTER SYMPTOMS
PALPITATIONS: 0
COUGH: 0
FEVER: 0
SHORTNESS OF BREATH: 0

## 2018-05-25 NOTE — PATIENT INSTRUCTIONS
Skin Yeast Infection  Skin yeast infection is a condition in which there is an overgrowth of yeast (candida) that normally lives on the skin. This condition usually occurs in areas of the skin that are constantly warm and moist, such as the armpits or the groin.  What are the causes?  This condition is caused by a change in the normal balance of the yeast and bacteria that live on the skin.  What increases the risk?  This condition is more likely to develop in:  · People who are obese.  · Pregnant women.  · Women who take birth control pills.  · People who have diabetes.  · People who take antibiotic medicines.  · People who take steroid medicines.  · People who are malnourished.  · People who have a weak defense (immune) system.  · People who are 65 years of age or older.  What are the signs or symptoms?  Symptoms of this condition include:  · A red, swollen area of the skin.  · Bumps on the skin.  · Itchiness.  How is this diagnosed?  This condition is diagnosed with a medical history and physical exam. Your health care provider may check for yeast by taking light scrapings of the skin to be viewed under a microscope.  How is this treated?  This condition is treated with medicine. Medicines may be prescribed or be available over-the-counter. The medicines may be:  · Taken by mouth (orally).  · Applied as a cream.  Follow these instructions at home:  · Take or apply over-the-counter and prescription medicines only as told by your health care provider.  · Eat more yogurt. This may help to keep your yeast infection from returning.  · Maintain a healthy weight. If you need help losing weight, talk with your health care provider.  · Keep your skin clean and dry.  · If you have diabetes, keep your blood sugar under control.  Contact a health care provider if:  · Your symptoms go away and then return.  · Your symptoms do not get better with treatment.  · Your symptoms get worse.  · Your rash spreads.  · You have a fever  or chills.  · You have new symptoms.  · You have new warmth or redness of your skin.  This information is not intended to replace advice given to you by your health care provider. Make sure you discuss any questions you have with your health care provider.  Document Released: 09/04/2012 Document Revised: 08/13/2017 Document Reviewed: 06/20/2016  [employer] Interactive Patient Education © 2017 [employer]

## 2018-05-25 NOTE — PROGRESS NOTES
"Subjective:      Tristen Palmer is a 38 y.o. male who presents with Rash (x3wks rash on lower back area and glute, redness bumps itchy)            Rash   This is a new problem. Episode onset: 3 weeks ago. The problem is unchanged. Location: perirectal area. The rash is characterized by redness and itchiness. He was exposed to nothing. Pertinent negatives include no cough, fever or shortness of breath. Past treatments include nothing.       Review of Systems   Constitutional: Negative for fever and malaise/fatigue.   Respiratory: Negative for cough and shortness of breath.    Cardiovascular: Negative for chest pain and palpitations.   Skin: Positive for itching and rash.   All other systems reviewed and are negative.    PMH:  has no past medical history on file.  MEDS:   Current Outpatient Prescriptions:   •  nystatin (MYCOSTATIN) powder, Apply to affected area twice daily until rash has resolved., Disp: 1 Bottle, Rfl: 0  •  Melatonin 1 MG Cap, Take  by mouth., Disp: , Rfl:   ALLERGIES: No Known Allergies  SURGHX: No past surgical history on file.  SOCHX:  reports that he has never smoked. He has never used smokeless tobacco. He reports that he drinks about 1.8 oz of alcohol per week . He reports that he does not use drugs.  FH: Family history was reviewed, no pertinent findings to report  Medications, Allergies, and current problem list reviewed today in Epic       Objective:     /70   Pulse 72   Temp 37.1 °C (98.8 °F)   Resp 16   Ht 1.721 m (5' 7.75\")   Wt 86.2 kg (190 lb)   SpO2 97%   BMI 29.10 kg/m²      Physical Exam   Constitutional: He appears well-developed and well-nourished.   Cardiovascular: Normal rate, regular rhythm and normal heart sounds.    Pulmonary/Chest: Effort normal and breath sounds normal.   Genitourinary:         Skin: Skin is warm and dry. Rash noted. There is erythema.   Psychiatric: He has a normal mood and affect. His behavior is normal. Judgment and thought content " normal.   Vitals reviewed.              Assessment/Plan:   Rash present for 3 weeks, itchy.  On appearance and location is consistent with candidal intertrigo.   1. Candidal intertrigo    - nystatin (MYCOSTATIN) powder; Apply to affected area twice daily until rash has resolved.  Dispense: 1 Bottle; Refill: 0    Differential diagnosis, natural history, supportive care discussed. Follow-up with primary care provider within 7-10 days, emergency room precautions discussed.  Patient and/or family appears understanding of information.  Handout and review of patients diagnosis and treatment was discussed extensively.

## 2018-05-31 ENCOUNTER — OFFICE VISIT (OUTPATIENT)
Dept: MEDICAL GROUP | Facility: MEDICAL CENTER | Age: 38
End: 2018-05-31
Payer: COMMERCIAL

## 2018-05-31 VITALS
OXYGEN SATURATION: 95 % | DIASTOLIC BLOOD PRESSURE: 66 MMHG | HEART RATE: 82 BPM | HEIGHT: 68 IN | TEMPERATURE: 98.6 F | WEIGHT: 184.97 LBS | BODY MASS INDEX: 28.03 KG/M2 | SYSTOLIC BLOOD PRESSURE: 118 MMHG | RESPIRATION RATE: 16 BRPM

## 2018-05-31 DIAGNOSIS — E78.2 MIXED HYPERLIPIDEMIA: ICD-10-CM

## 2018-05-31 DIAGNOSIS — K64.9 HEMORRHOIDS, UNSPECIFIED HEMORRHOID TYPE: ICD-10-CM

## 2018-05-31 PROCEDURE — 99214 OFFICE O/P EST MOD 30 MIN: CPT | Performed by: PHYSICIAN ASSISTANT

## 2018-05-31 ASSESSMENT — PATIENT HEALTH QUESTIONNAIRE - PHQ9: CLINICAL INTERPRETATION OF PHQ2 SCORE: 0

## 2018-05-31 NOTE — ASSESSMENT & PLAN NOTE
Has been dealing with hemorrhoids recently. Had a hemorrhoid that was painful for a couple weeks. Was seen at the urgent care for sequelae with the hemorrhoid. Was diagnosed with a candidal infection. Is doing much better. Denies any rectal pain. No rectal bleeding.

## 2018-05-31 NOTE — ASSESSMENT & PLAN NOTE
This is a 38-year-old male who is here today to discuss his chronic history of elevated cholesterol. Recent labs showed the following:    Results for ARNAUD GARDNER (MRN [id number]) as of 5/31/2018 09:19   Ref. Range 2/24/2017 09:33 6/12/2017 08:44   Cholesterol,Tot Latest Ref Range: 100 - 199 mg/dL 272 (H) 252 (H)   Triglycerides Latest Ref Range: 0 - 149 mg/dL 156 (H) 247 (H)   HDL Latest Ref Range: >=40 mg/dL 60 51   LDL Latest Ref Range: <100 mg/dL 181 (H) 152 (H)     He has been trying to eat healthier. Exercises routinely doing weight lifting. He would like to have his labs repeated once more and then if not a significant improvement will start lipid lowering medication.

## 2018-05-31 NOTE — PROGRESS NOTES
"Subjective:   Tristen Gardner is a 38 y.o. male here today for discussion of hyperlipidemia and hemorrhoids.    Mixed hyperlipidemia  This is a 38-year-old male who is here today to discuss his chronic history of elevated cholesterol. Recent labs showed the following:    Results for TRISTEN GARDNER (MRN [id number]) as of 5/31/2018 09:19   Ref. Range 2/24/2017 09:33 6/12/2017 08:44   Cholesterol,Tot Latest Ref Range: 100 - 199 mg/dL 272 (H) 252 (H)   Triglycerides Latest Ref Range: 0 - 149 mg/dL 156 (H) 247 (H)   HDL Latest Ref Range: >=40 mg/dL 60 51   LDL Latest Ref Range: <100 mg/dL 181 (H) 152 (H)     He has been trying to eat healthier. Exercises routinely doing weight lifting. He would like to have his labs repeated once more and then if not a significant improvement will start lipid lowering medication.    Hemorrhoid  Has been dealing with hemorrhoids recently. Had a hemorrhoid that was painful for a couple weeks. Was seen at the urgent care for sequelae with the hemorrhoid. Was diagnosed with a candidal infection. Is doing much better. Denies any rectal pain. No rectal bleeding.       Current medicines (including changes today)  No current outpatient prescriptions on file.     No current facility-administered medications for this visit.      He  has no past medical history on file.    Social History and Family History were reviewed and updated.    ROS   No chest pain, no shortness of breath, no abdominal pain and all other systems were reviewed and are negative.       Objective:     Blood pressure 118/66, pulse 82, temperature 37 °C (98.6 °F), resp. rate 16, height 1.721 m (5' 7.75\"), weight 83.9 kg (184 lb 15.5 oz), SpO2 95 %. Body mass index is 28.33 kg/m².   Physical Exam:  Constitutional: Alert, no distress.  Skin: Warm, dry, good turgor, no rashes in visible areas.  Eye: Equal, round and reactive, conjunctiva clear, lids normal.  ENMT: Lips without lesions, good dentition, oropharynx " clear.  Neck: Trachea midline, no masses.   Lymph: No cervical or supraclavicular lymphadenopathy  Respiratory: Unlabored respiratory effort, lungs appear clear, no wheezes.  Cardiovascular: Regular rate and rhythm, no edema.  Rectal exam was deferred.  Psych: Alert and oriented x3, normal affect and mood.        Assessment and Plan:   The following treatment plan was discussed    1. Mixed hyperlipidemia  Chronic condition. Advise may continue to exercise routinely but prefer cardiovascular activity. Continue healthy diet. Will check lipid panel. At this point if it is elevated will suggest medication again. We'll then recheck lipid panel and hepatic functioning.  - LIPID PROFILE; Future    2. Hemorrhoids, unspecified hemorrhoid type  Acute, new onset condition. Resolved. Advised next time he has exacerbation of hemorrhoids may contact me for referral to see GI.    Patient was seen for 25 minutes face to face of which > 50% of appointment time was spent on counseling and coordination of care regarding the above.    Followup: Return if symptoms worsen or fail to improve.    Please note that this dictation was created using voice recognition software. I have made every reasonable attempt to correct obvious errors, but I expect that there are errors of grammar and possibly content that I did not discover before finalizing the note.

## 2018-08-22 ENCOUNTER — OFFICE VISIT (OUTPATIENT)
Dept: MEDICAL GROUP | Facility: LAB | Age: 38
End: 2018-08-22
Payer: COMMERCIAL

## 2018-08-22 VITALS
SYSTOLIC BLOOD PRESSURE: 120 MMHG | OXYGEN SATURATION: 96 % | HEIGHT: 68 IN | DIASTOLIC BLOOD PRESSURE: 78 MMHG | BODY MASS INDEX: 27.74 KG/M2 | WEIGHT: 183 LBS | HEART RATE: 68 BPM | RESPIRATION RATE: 16 BRPM | TEMPERATURE: 97.9 F

## 2018-08-22 DIAGNOSIS — R10.9 LEFT FLANK PAIN: ICD-10-CM

## 2018-08-22 LAB
APPEARANCE UR: CLEAR
BILIRUB UR STRIP-MCNC: NORMAL MG/DL
COLOR UR AUTO: YELLOW
GLUCOSE UR STRIP.AUTO-MCNC: NORMAL MG/DL
KETONES UR STRIP.AUTO-MCNC: NORMAL MG/DL
LEUKOCYTE ESTERASE UR QL STRIP.AUTO: NORMAL
NITRITE UR QL STRIP.AUTO: NORMAL
PH UR STRIP.AUTO: 6.5 [PH] (ref 5–8)
PROT UR QL STRIP: NORMAL MG/DL
RBC UR QL AUTO: NORMAL
SP GR UR STRIP.AUTO: 1.01
UROBILINOGEN UR STRIP-MCNC: 0.2 MG/DL

## 2018-08-22 PROCEDURE — 81002 URINALYSIS NONAUTO W/O SCOPE: CPT | Performed by: INTERNAL MEDICINE

## 2018-08-22 PROCEDURE — 99213 OFFICE O/P EST LOW 20 MIN: CPT | Performed by: INTERNAL MEDICINE

## 2018-08-23 NOTE — PROGRESS NOTES
CC: Tristen Palmer is a 38 y.o. male is suffering from   Chief Complaint   Patient presents with   • Flank Pain     left flank pain         SUBJECTIVE:  1. Left flank pain  Tristen is here for follow-up, experienced left flank  pain which is 80% improved versus his initial pain he was experiencing 2 nights prior.  Patient states there is a family history of kidney stones.  Has never personally experienced a kidney stone.  Patient is not having any pain or discomfort with urination.  Denies any concerns about STDs.        Past social, family, history:   Social History   Substance Use Topics   • Smoking status: Never Smoker   • Smokeless tobacco: Never Used   • Alcohol use 2.4 oz/week     4 Glasses of wine per week         MEDICATIONS:  No current outpatient prescriptions on file.    PROBLEMS:  Patient Active Problem List    Diagnosis Date Noted   • Hemorrhoid 05/31/2018   • Primary insomnia 10/12/2017   • Mixed hyperlipidemia 06/05/2017   • Atypical nevus 06/05/2017   • Onychomycosis 02/10/2017   • Seasonal affective disorder (HCC) 02/10/2017   • Genital warts 02/10/2017       REVIEW OF SYSTEMS:  Gen.:  No Nausea, Vomiting, fever, Chills.  Heart: No chest pain.  Lungs:  No shortness of Breath.  Psychological: Sergio unusual Anxiety depression     PHYSICAL EXAM   Constitutional: Alert, cooperative, not in acute distress.  Cardiovascular:  Rate Rhythm is regular without murmurs rubs clicks.     Thorax & Lungs: Clear to auscultation, no wheezing, rhonchi, or rales  HENT: Normocephalic, Atraumatic.  Eyes: PERRLA, EOMI, Conjunctiva normal.   Neck: Trachia is midline no swelling of the thyroid.   Lymphatic: No lymphadenopathy noted.   Musculoskeletal: Positive Byrne's punch  Neurologic: Alert & oriented x 3, cranial nerves II through XII are intact, Normal motor function, Normal sensory function, No focal deficits noted.   Psychiatric: Affect normal, Judgment normal, Mood normal.     VITAL SIGNS:/78    "Pulse 68   Temp 36.6 °C (97.9 °F)   Resp 16   Ht 1.721 m (5' 7.75\")   Wt 83 kg (183 lb)   SpO2 96%   BMI 28.03 kg/m²     Labs: Reviewed    Assessment:                                                     Plan:    1. Left flank pain  Left flank pain urinalysis is negative suspect probably a passed small kidney stone versus other unknown process or the patient's 80-90% improved no action taken.  - POCT Urinalysis        "

## 2018-11-20 ENCOUNTER — NON-PROVIDER VISIT (OUTPATIENT)
Dept: MEDICAL GROUP | Facility: MEDICAL CENTER | Age: 38
End: 2018-11-20
Payer: COMMERCIAL

## 2018-11-20 DIAGNOSIS — Z23 NEED FOR PROPHYLACTIC VACCINATION WITH COMBINED DIPHTHERIA-TETANUS-PERTUSSIS (DTAP) VACCINE: ICD-10-CM

## 2018-11-20 DIAGNOSIS — Z23 NEED FOR VACCINATION: ICD-10-CM

## 2018-11-20 PROCEDURE — 90472 IMMUNIZATION ADMIN EACH ADD: CPT | Performed by: PHYSICIAN ASSISTANT

## 2018-11-20 PROCEDURE — 90686 IIV4 VACC NO PRSV 0.5 ML IM: CPT | Performed by: PHYSICIAN ASSISTANT

## 2018-11-20 PROCEDURE — 90715 TDAP VACCINE 7 YRS/> IM: CPT | Performed by: FAMILY MEDICINE

## 2018-11-20 PROCEDURE — 90471 IMMUNIZATION ADMIN: CPT | Performed by: FAMILY MEDICINE

## 2019-11-25 ENCOUNTER — OFFICE VISIT (OUTPATIENT)
Dept: MEDICAL GROUP | Facility: MEDICAL CENTER | Age: 39
End: 2019-11-25
Payer: COMMERCIAL

## 2019-11-25 VITALS
WEIGHT: 196 LBS | RESPIRATION RATE: 16 BRPM | SYSTOLIC BLOOD PRESSURE: 122 MMHG | OXYGEN SATURATION: 95 % | HEART RATE: 72 BPM | TEMPERATURE: 99.4 F | HEIGHT: 68 IN | DIASTOLIC BLOOD PRESSURE: 74 MMHG | BODY MASS INDEX: 29.7 KG/M2

## 2019-11-25 DIAGNOSIS — Z00.00 PREVENTATIVE HEALTH CARE: ICD-10-CM

## 2019-11-25 DIAGNOSIS — K62.5 RECTAL BLEEDING: ICD-10-CM

## 2019-11-25 DIAGNOSIS — E78.2 MIXED HYPERLIPIDEMIA: ICD-10-CM

## 2019-11-25 DIAGNOSIS — A63.0 GENITAL WARTS: ICD-10-CM

## 2019-11-25 PROCEDURE — 99214 OFFICE O/P EST MOD 30 MIN: CPT | Performed by: PHYSICIAN ASSISTANT

## 2019-11-25 ASSESSMENT — PATIENT HEALTH QUESTIONNAIRE - PHQ9: CLINICAL INTERPRETATION OF PHQ2 SCORE: 0

## 2019-11-25 NOTE — PROGRESS NOTES
Subjective:   Tristen Palmer is a 39 y.o. male here today for hyperlipidemia, rectal bleeding, genital warts and preventative health care.    Mixed hyperlipidemia  This is a 39-year-old male with a chronic history of hyperlipidemia.  Couple years ago his LDL was in the 170s.  Dropped into the 150s.  He did not follow-up after that.  Parents are alive and well.  No early CAD with his parents.  Grandmother though did pass away early in life from a heart attack.  He does not want to take any oral medications.  Has been exercising pretty routinely up until the past few weeks.  Is trying to eat healthy.    Rectal bleeding  Has a chronic history of rectal bleeding.  Couple years ago we talked about hemorrhoids.  He states over the past month the rectal bleeding occurred.  It will happen only when he wipes.  He will have some drops of blood on the toilet paper.  Believes he has an external hemorrhoid.  Denies any pain though.  Denies any change in bowel habits.  No weight loss.  No diarrhea or constipation.  A friend recently was diagnosed with colon cancer.  She was 38.  She had rectal bleeding and colonoscopy revealed a rectal mass.  He is concerned about colon cancer.  Denies any hard stool.  Denies any long periods on the toilet.    Genital warts  Chronic history of genital warts.  During his last appointment a few years ago I referred him to dermatology.  He was seen at [facility] dermatology.  Had a few sessions for cryotherapy which was successful but some of the warts have returned.  He is for them currently that he wants treatment.         Current medicines (including changes today)  No current outpatient medications on file.     No current facility-administered medications for this visit.      He  has no past medical history on file.    Social History and Family History were reviewed and updated.    ROS   No chest pain, no shortness of breath, no abdominal pain and all other systems were reviewed and are  "negative.       Objective:     /74 (BP Location: Left arm, Patient Position: Sitting, BP Cuff Size: Adult)   Pulse 72   Temp 37.4 °C (99.4 °F) (Temporal)   Resp 16   Ht 1.727 m (5' 8\")   Wt 88.9 kg (196 lb)   SpO2 95%  Body mass index is 29.8 kg/m².   Physical Exam:  Constitutional: Alert, no distress.  Skin: Warm, dry, good turgor, no rashes in visible areas.  Eye: Equal, round and reactive, conjunctiva clear, lids normal.  ENMT: Lips without lesions, good dentition, oropharynx clear.  Neck: Trachea midline, no masses.   Lymph: No cervical or supraclavicular lymphadenopathy  Respiratory: Unlabored respiratory effort, lungs clear to auscultation, no wheezes, no ronchi.  Cardiovascular: Normal S1, S2, no murmur, no edema.  Abdomen: Soft, non-tender, no masses.  Rectal/genital exam declined by patient.   Psych: Alert and oriented x3, normal affect and mood.        Assessment and Plan:   The following treatment plan was discussed    1. Mixed hyperlipidemia  Chronic condition.  Continue dietary and exercising.  Order cholesterol profile.  Ordered CT cardiac scoring to check his calcium levels.  Discussed test and its risk and benefits.  - Lipid Profile; Future  - CT-CARDIAC SCORING; Future    2. Preventative health care  Ordered labs fasting.  Will be contacted with the results.  - Comp Metabolic Panel; Future  - HEMOGLOBIN A1C; Future    3. Genital warts  Chronic condition.  Refer to dermatology.  Advised him as well that I could perform cryotherapy in the office.  He declined.  Advised at any point he may return to have liquid nitrogen applied.  - REFERRAL TO DERMATOLOGY    4. Rectal bleeding  Chronic condition.  He has had rectal bleed in the past.  Still believe rectal bleeding is secondary to hemorrhoids.  Advised him to contact me in 2 to 3 weeks of rectal bleeding continues for referral to see GI.  That point he will need either evaluation with a colonoscopy or evaluation of his hemorrhoids.  " Hemorrhoids are currently asymptomatic so advise no treatment plan.      Followup: Return if symptoms worsen or fail to improve.    Please note that this dictation was created using voice recognition software. I have made every reasonable attempt to correct obvious errors, but I expect that there are errors of grammar and possibly content that I did not discover before finalizing the note.

## 2019-11-25 NOTE — ASSESSMENT & PLAN NOTE
Chronic history of genital warts.  During his last appointment a few years ago I referred him to dermatology.  He was seen at [facility] dermatology.  Had a few sessions for cryotherapy which was successful but some of the warts have returned.  He is for them currently that he wants treatment.

## 2019-11-25 NOTE — ASSESSMENT & PLAN NOTE
This is a 39-year-old male with a chronic history of hyperlipidemia.  Couple years ago his LDL was in the 170s.  Dropped into the 150s.  He did not follow-up after that.  Parents are alive and well.  No early CAD with his parents.  Grandmother though did pass away early in life from a heart attack.  He does not want to take any oral medications.  Has been exercising pretty routinely up until the past few weeks.  Is trying to eat healthy.

## 2019-11-25 NOTE — ASSESSMENT & PLAN NOTE
Has a chronic history of rectal bleeding.  Couple years ago we talked about hemorrhoids.  He states over the past month the rectal bleeding occurred.  It will happen only when he wipes.  He will have some drops of blood on the toilet paper.  Believes he has an external hemorrhoid.  Denies any pain though.  Denies any change in bowel habits.  No weight loss.  No diarrhea or constipation.  A friend recently was diagnosed with colon cancer.  She was 38.  She had rectal bleeding and colonoscopy revealed a rectal mass.  He is concerned about colon cancer.  Denies any hard stool.  Denies any long periods on the toilet.

## 2019-11-26 ENCOUNTER — HOSPITAL ENCOUNTER (OUTPATIENT)
Dept: LAB | Facility: MEDICAL CENTER | Age: 39
End: 2019-11-26
Attending: PHYSICIAN ASSISTANT
Payer: COMMERCIAL

## 2019-11-26 DIAGNOSIS — Z00.00 PREVENTATIVE HEALTH CARE: ICD-10-CM

## 2019-11-26 DIAGNOSIS — E78.2 MIXED HYPERLIPIDEMIA: ICD-10-CM

## 2019-11-26 LAB
ALBUMIN SERPL BCP-MCNC: 5.1 G/DL (ref 3.2–4.9)
ALBUMIN/GLOB SERPL: 2.1 G/DL
ALP SERPL-CCNC: 64 U/L (ref 30–99)
ALT SERPL-CCNC: 39 U/L (ref 2–50)
ANION GAP SERPL CALC-SCNC: 9 MMOL/L (ref 0–11.9)
AST SERPL-CCNC: 23 U/L (ref 12–45)
BILIRUB SERPL-MCNC: 0.4 MG/DL (ref 0.1–1.5)
BUN SERPL-MCNC: 16 MG/DL (ref 8–22)
CALCIUM SERPL-MCNC: 9.8 MG/DL (ref 8.5–10.5)
CHLORIDE SERPL-SCNC: 104 MMOL/L (ref 96–112)
CHOLEST SERPL-MCNC: 293 MG/DL (ref 100–199)
CO2 SERPL-SCNC: 29 MMOL/L (ref 20–33)
CREAT SERPL-MCNC: 1.12 MG/DL (ref 0.5–1.4)
EST. AVERAGE GLUCOSE BLD GHB EST-MCNC: 111 MG/DL
FASTING STATUS PATIENT QL REPORTED: NORMAL
GLOBULIN SER CALC-MCNC: 2.4 G/DL (ref 1.9–3.5)
GLUCOSE SERPL-MCNC: 97 MG/DL (ref 65–99)
HBA1C MFR BLD: 5.5 % (ref 0–5.6)
HDLC SERPL-MCNC: 49 MG/DL
LDLC SERPL CALC-MCNC: 178 MG/DL
POTASSIUM SERPL-SCNC: 4.4 MMOL/L (ref 3.6–5.5)
PROT SERPL-MCNC: 7.5 G/DL (ref 6–8.2)
SODIUM SERPL-SCNC: 142 MMOL/L (ref 135–145)
TRIGL SERPL-MCNC: 332 MG/DL (ref 0–149)

## 2019-11-26 PROCEDURE — 36415 COLL VENOUS BLD VENIPUNCTURE: CPT

## 2019-11-26 PROCEDURE — 80061 LIPID PANEL: CPT

## 2019-11-26 PROCEDURE — 80053 COMPREHEN METABOLIC PANEL: CPT

## 2019-11-26 PROCEDURE — 83036 HEMOGLOBIN GLYCOSYLATED A1C: CPT

## 2019-12-04 ENCOUNTER — HOSPITAL ENCOUNTER (OUTPATIENT)
Dept: RADIOLOGY | Facility: MEDICAL CENTER | Age: 39
End: 2019-12-04
Attending: PHYSICIAN ASSISTANT
Payer: COMMERCIAL

## 2019-12-04 DIAGNOSIS — E78.2 MIXED HYPERLIPIDEMIA: ICD-10-CM

## 2019-12-04 PROCEDURE — 4410556 CT-CARDIAC SCORING

## 2020-10-14 DIAGNOSIS — D22.9 ATYPICAL NEVUS: ICD-10-CM

## 2020-10-14 DIAGNOSIS — A63.0 GENITAL WARTS: ICD-10-CM

## 2020-11-05 ENCOUNTER — APPOINTMENT (RX ONLY)
Dept: URBAN - METROPOLITAN AREA CLINIC 31 | Facility: CLINIC | Age: 40
Setting detail: DERMATOLOGY
End: 2020-11-05

## 2020-11-05 DIAGNOSIS — A63.0 ANOGENITAL (VENEREAL) WARTS: ICD-10-CM

## 2020-11-05 DIAGNOSIS — B35.6 TINEA CRURIS: ICD-10-CM

## 2020-11-05 PROCEDURE — ? LIQUID NITROGEN

## 2020-11-05 PROCEDURE — 17110 DESTRUCTION B9 LES UP TO 14: CPT

## 2020-11-05 PROCEDURE — 99202 OFFICE O/P NEW SF 15 MIN: CPT | Mod: 25

## 2020-11-05 PROCEDURE — ? COUNSELING

## 2020-11-05 PROCEDURE — ? PRESCRIPTION

## 2020-11-05 PROCEDURE — ? ADDITIONAL NOTES

## 2020-11-05 RX ORDER — KETOCONAZOLE 20 MG/G
CREAM TOPICAL BID
Qty: 1 | Refills: 3 | Status: ERX | COMMUNITY
Start: 2020-11-05

## 2020-11-05 RX ORDER — IMIQUIMOD 50 MG/G
CREAM TOPICAL TIW
Qty: 2 | Refills: 3 | Status: ERX | COMMUNITY
Start: 2020-11-05

## 2020-11-05 RX ADMIN — IMIQUIMOD: 50 CREAM TOPICAL at 00:00

## 2020-11-05 RX ADMIN — KETOCONAZOLE: 20 CREAM TOPICAL at 00:00

## 2020-11-05 ASSESSMENT — LOCATION ZONE DERM
LOCATION ZONE: PENIS
LOCATION ZONE: GENITALIA

## 2020-11-05 ASSESSMENT — LOCATION SIMPLE DESCRIPTION DERM
LOCATION SIMPLE: SCROTUM
LOCATION SIMPLE: PENIS

## 2020-11-05 ASSESSMENT — LOCATION DETAILED DESCRIPTION DERM
LOCATION DETAILED: RIGHT ANTERIOR SCROTUM
LOCATION DETAILED: RIGHT DORSAL SHAFT OF PENIS

## 2020-11-05 NOTE — PROCEDURE: ADDITIONAL NOTES
Additional Notes: Will treat with Aldara will start using three times a week\\nCryotherapy will also be use to treat every two weeks
Detail Level: Detailed

## 2020-11-05 NOTE — HPI: EVALUATION OF SKIN LESION(S)
What Type Of Note Output Would You Prefer (Optional)?: Standard Output
How Severe Are Your Spot(S)?: mild
Have Your Spot(S) Been Treated In The Past?: has not been treated
Hpi Title: Evaluation of Skin Lesions
Additional History: Patient was dx with pcp and has had cryosurgery tx in past

## 2020-11-17 ENCOUNTER — APPOINTMENT (RX ONLY)
Dept: URBAN - METROPOLITAN AREA CLINIC 31 | Facility: CLINIC | Age: 40
Setting detail: DERMATOLOGY
End: 2020-11-17

## 2020-11-17 DIAGNOSIS — A63.0 ANOGENITAL (VENEREAL) WARTS: ICD-10-CM

## 2020-11-17 PROCEDURE — ? ADDITIONAL NOTES

## 2020-11-17 PROCEDURE — ? COUNSELING

## 2020-11-17 PROCEDURE — 17110 DESTRUCTION B9 LES UP TO 14: CPT

## 2020-11-17 PROCEDURE — ? LIQUID NITROGEN

## 2020-11-17 ASSESSMENT — LOCATION SIMPLE DESCRIPTION DERM: LOCATION SIMPLE: PENIS

## 2020-11-17 ASSESSMENT — LOCATION DETAILED DESCRIPTION DERM
LOCATION DETAILED: RIGHT DORSAL SHAFT OF PENIS
LOCATION DETAILED: LEFT DORSAL SHAFT OF PENIS
LOCATION DETAILED: BASE OF THE PENIS

## 2020-11-17 ASSESSMENT — LOCATION ZONE DERM: LOCATION ZONE: PENIS

## 2020-11-17 NOTE — PROCEDURE: ADDITIONAL NOTES
Additional Notes: Continue use of Aldara, increase to Monday-Friday once daily. RTC 2 weeks
Detail Level: Detailed

## 2021-07-26 ENCOUNTER — OFFICE VISIT (OUTPATIENT)
Dept: MEDICAL GROUP | Facility: MEDICAL CENTER | Age: 41
End: 2021-07-26
Payer: COMMERCIAL

## 2021-07-26 VITALS
OXYGEN SATURATION: 94 % | SYSTOLIC BLOOD PRESSURE: 120 MMHG | WEIGHT: 199.74 LBS | DIASTOLIC BLOOD PRESSURE: 72 MMHG | BODY MASS INDEX: 29.58 KG/M2 | HEART RATE: 63 BPM | TEMPERATURE: 98.8 F | HEIGHT: 69 IN

## 2021-07-26 DIAGNOSIS — Z00.00 ANNUAL PHYSICAL EXAM: ICD-10-CM

## 2021-07-26 DIAGNOSIS — E78.2 MIXED HYPERLIPIDEMIA: ICD-10-CM

## 2021-07-26 DIAGNOSIS — Z00.00 PREVENTATIVE HEALTH CARE: ICD-10-CM

## 2021-07-26 DIAGNOSIS — R53.82 CHRONIC FATIGUE: ICD-10-CM

## 2021-07-26 DIAGNOSIS — A63.0 GENITAL WARTS: ICD-10-CM

## 2021-07-26 PROCEDURE — 99214 OFFICE O/P EST MOD 30 MIN: CPT | Performed by: PHYSICIAN ASSISTANT

## 2021-07-26 ASSESSMENT — PATIENT HEALTH QUESTIONNAIRE - PHQ9: CLINICAL INTERPRETATION OF PHQ2 SCORE: 0

## 2021-07-26 NOTE — PROGRESS NOTES
"Subjective:   Tristen Palmer is a 41 y.o. male here today for annual physical.    Annual physical exam  This is a pleasant 41-year-old male here today to discuss his health.  Requesting possible testosterone labs.  He has some issues with fatigue.  His libido has decreased over the years.  Also needs updated labs.  History of mixed hyperlipidemia.  Did order a CT cardiac score that returned at a 0 level.  No other concerns today.       Current medicines (including changes today)  No current outpatient medications on file.     No current facility-administered medications for this visit.     He  has no past medical history on file.    Social History and Family History were reviewed and updated.    ROS   No chest pain, no shortness of breath, no abdominal pain and all other systems were reviewed and are negative.       Objective:     /72   Pulse 63   Temp 37.1 °C (98.8 °F) (Temporal)   Ht 1.753 m (5' 9\")   Wt 90.6 kg (199 lb 11.8 oz)   SpO2 94%  Body mass index is 29.5 kg/m².   Physical Exam:  Constitutional: Alert, no distress.  Skin: Warm, dry, good turgor, no rashes in visible areas.  Eye: Equal, round and reactive, conjunctiva clear, lids normal.  ENMT: Lips without lesions, good dentition, oropharynx clear.  Neck: Trachea midline, no masses.   Lymph: No cervical or supraclavicular lymphadenopathy  Respiratory: Unlabored respiratory effort, lungs appear clear, no wheezes.  Cardiovascular: Regular rate rhythm.  Psych: Alert and oriented x3, normal affect and mood.        Assessment and Plan:   The following treatment plan was discussed    1. Chronic fatigue  Chronic condition.  Will check labs.  Exercise routinely.  Eat healthy.  - Testosterone, Free & Total, Adult Male (w/SHBG); Future  - CBC WITH DIFFERENTIAL; Future  - TSH WITH REFLEX TO FT4; Future    2. Mixed hyperlipidemia  Chronic condition.  Will order cholesterol profile.  Reviewed CT calcium score results.    3.  Annual physical " exam  Order labs fasting.  He will be contacted with the results.  Fast 8 hours and perform between 7 and 9 AM.  - Testosterone, Free & Total, Adult Male (w/SHBG); Future  - Lipid Profile; Future  - Comp Metabolic Panel; Future  - HEMOGLOBIN A1C; Future  - CBC WITH DIFFERENTIAL; Future  - TSH WITH REFLEX TO FT4; Future        Followup: Return in about 1 year (around 7/26/2022), or if symptoms worsen or fail to improve.    Please note that this dictation was created using voice recognition software. I have made every reasonable attempt to correct obvious errors, but I expect that there are errors of grammar and possibly content that I did not discover before finalizing the note.

## 2021-07-26 NOTE — ASSESSMENT & PLAN NOTE
This is a pleasant 41-year-old male here today to discuss his health.  Requesting possible testosterone labs.  He has some issues with fatigue.  His libido has decreased over the years.  Also needs updated labs.  History of mixed hyperlipidemia.  Did order a CT cardiac score that returned at a 0 level.  No other concerns today.

## 2021-10-29 ENCOUNTER — OFFICE VISIT (OUTPATIENT)
Dept: URGENT CARE | Facility: CLINIC | Age: 41
End: 2021-10-29
Payer: COMMERCIAL

## 2021-10-29 VITALS
RESPIRATION RATE: 16 BRPM | OXYGEN SATURATION: 98 % | HEART RATE: 77 BPM | TEMPERATURE: 98.6 F | SYSTOLIC BLOOD PRESSURE: 114 MMHG | HEIGHT: 69 IN | DIASTOLIC BLOOD PRESSURE: 72 MMHG | WEIGHT: 200 LBS | BODY MASS INDEX: 29.62 KG/M2

## 2021-10-29 DIAGNOSIS — L02.91 ABSCESS: ICD-10-CM

## 2021-10-29 PROCEDURE — 99213 OFFICE O/P EST LOW 20 MIN: CPT | Performed by: NURSE PRACTITIONER

## 2021-10-29 RX ORDER — SULFAMETHOXAZOLE AND TRIMETHOPRIM 800; 160 MG/1; MG/1
1 TABLET ORAL 2 TIMES DAILY
Qty: 20 TABLET | Refills: 0 | Status: SHIPPED | OUTPATIENT
Start: 2021-10-29 | End: 2021-11-19 | Stop reason: SDUPTHER

## 2021-10-29 NOTE — PATIENT INSTRUCTIONS
Skin Abscess    A skin abscess is an infected area on or under your skin that contains a collection of pus and other material. An abscess may also be called a furuncle, carbuncle, or boil. An abscess can occur in or on almost any part of your body.  Some abscesses break open (rupture) on their own. Most continue to get worse unless they are treated. The infection can spread deeper into the body and eventually into your blood, which can make you feel ill. Treatment usually involves draining the abscess.  What are the causes?  An abscess occurs when germs, like bacteria, pass through your skin and cause an infection. This may be caused by:  · A scrape or cut on your skin.  · A puncture wound through your skin, including a needle injection or insect bite.  · Blocked oil or sweat glands.  · Blocked and infected hair follicles.  · A cyst that forms beneath your skin (sebaceous cyst) and becomes infected.  What increases the risk?  This condition is more likely to develop in people who:  · Have a weak body defense system (immune system).  · Have diabetes.  · Have dry and irritated skin.  · Get frequent injections or use illegal IV drugs.  · Have a foreign body in a wound, such as a splinter.  · Have problems with their lymph system or veins.  What are the signs or symptoms?  Symptoms of this condition include:  · A painful, firm bump under the skin.  · A bump with pus at the top. This may break through the skin and drain.  Other symptoms include:  · Redness surrounding the abscess site.  · Warmth.  · Swelling of the lymph nodes (glands) near the abscess.  · Tenderness.  · A sore on the skin.  How is this diagnosed?  This condition may be diagnosed based on:  · A physical exam.  · Your medical history.  · A sample of pus. This may be used to find out what is causing the infection.  · Blood tests.  · Imaging tests, such as an ultrasound, CT scan, or MRI.  How is this treated?  A small abscess that drains on its own may not  need treatment. Treatment for larger abscesses may include:  · Moist heat or heat pack applied to the area several times a day.  · A procedure to drain the abscess (incision and drainage).  · Antibiotic medicines. For a severe abscess, you may first get antibiotics through an IV and then change to antibiotics by mouth.  Follow these instructions at home:  Medicines    · Take over-the-counter and prescription medicines only as told by your health care provider.  · If you were prescribed an antibiotic medicine, take it as told by your health care provider. Do not stop taking the antibiotic even if you start to feel better.  Abscess care    · If you have an abscess that has not drained, apply heat to the affected area. Use the heat source that your health care provider recommends, such as a moist heat pack or a heating pad.  ? Place a towel between your skin and the heat source.  ? Leave the heat on for 20-30 minutes.  ? Remove the heat if your skin turns bright red. This is especially important if you are unable to feel pain, heat, or cold. You may have a greater risk of getting burned.  · Follow instructions from your health care provider about how to take care of your abscess. Make sure you:  ? Cover the abscess with a bandage (dressing).  ? Change your dressing or gauze as told by your health care provider.  ? Wash your hands with soap and water before you change the dressing or gauze. If soap and water are not available, use hand .  · Check your abscess every day for signs of a worsening infection. Check for:  ? More redness, swelling, or pain.  ? More fluid or blood.  ? Warmth.  ? More pus or a bad smell.  General instructions  · To avoid spreading the infection:  ? Do not share personal care items, towels, or hot tubs with others.  ? Avoid making skin contact with other people.  · Keep all follow-up visits as told by your health care provider. This is important.  Contact a health care provider if you  have:  · More redness, swelling, or pain around your abscess.  · More fluid or blood coming from your abscess.  · Warm skin around your abscess.  · More pus or a bad smell coming from your abscess.  · A fever.  · Muscle aches.  · Chills or a general ill feeling.  Get help right away if you:  · Have severe pain.  · See red streaks on your skin spreading away from the abscess.  Summary  · A skin abscess is an infected area on or under your skin that contains a collection of pus and other material.  · A small abscess that drains on its own may not need treatment.  · Treatment for larger abscesses may include having a procedure to drain the abscess and taking an antibiotic.  This information is not intended to replace advice given to you by your health care provider. Make sure you discuss any questions you have with your health care provider.  Document Released: 09/27/2006 Document Revised: 04/09/2020 Document Reviewed: 01/31/2019  [employer] Patient Education © 2020 [employer]

## 2021-10-29 NOTE — PROGRESS NOTES
Subjective:     Tristen Palmer is a 41 y.o. male who presents for Rash (WAIST - POSS STAPH)      Presents for abscess to waist states he tried to squeeze it, and was unable to get anything out.     Rash  This is a new problem. The current episode started yesterday. The problem has been gradually worsening since onset. The rash is characterized by swelling. Pertinent negatives include no fever.       No past medical history on file.    No past surgical history on file.    Social History     Socioeconomic History   • Marital status:      Spouse name: Not on file   • Number of children: Not on file   • Years of education: Not on file   • Highest education level: Not on file   Occupational History   • Not on file   Tobacco Use   • Smoking status: Never Smoker   • Smokeless tobacco: Never Used   Substance and Sexual Activity   • Alcohol use: Yes     Alcohol/week: 2.4 oz     Types: 4 Glasses of wine per week   • Drug use: No     Comment: past marijuana, MDMA, mushrooms,and coke-in college   • Sexual activity: Yes     Partners: Female     Comment: , GF, one child (son)   Other Topics Concern   • Not on file   Social History Narrative   • Not on file     Social Determinants of Health     Financial Resource Strain:    • Difficulty of Paying Living Expenses:    Food Insecurity:    • Worried About Running Out of Food in the Last Year:    • Ran Out of Food in the Last Year:    Transportation Needs:    • Lack of Transportation (Medical):    • Lack of Transportation (Non-Medical):    Physical Activity:    • Days of Exercise per Week:    • Minutes of Exercise per Session:    Stress:    • Feeling of Stress :    Social Connections:    • Frequency of Communication with Friends and Family:    • Frequency of Social Gatherings with Friends and Family:    • Attends Hindu Services:    • Active Member of Clubs or Organizations:    • Attends Club or Organization Meetings:    • Marital Status:    Intimate Partner  "Violence:    • Fear of Current or Ex-Partner:    • Emotionally Abused:    • Physically Abused:    • Sexually Abused:         No family history on file.     No Known Allergies    Review of Systems   Constitutional: Negative for fever.   Skin: Negative for itching.   All other systems reviewed and are negative.       Objective:   /72 (BP Location: Left arm, Patient Position: Sitting, BP Cuff Size: Large adult)   Pulse 77   Temp 37 °C (98.6 °F) (Temporal)   Resp 16   Ht 1.753 m (5' 9\")   Wt 90.7 kg (200 lb)   SpO2 98%   BMI 29.53 kg/m²     Physical Exam  Vitals reviewed.   Constitutional:       General: He is not in acute distress.     Appearance: He is well-developed.   HENT:      Head: Normocephalic and atraumatic.   Cardiovascular:      Rate and Rhythm: Normal rate.   Pulmonary:      Effort: Pulmonary effort is normal. No respiratory distress.   Musculoskeletal:      Cervical back: Normal range of motion.   Lymphadenopathy:      Lower Body: No right inguinal adenopathy.   Skin:     General: Skin is warm and dry.      Findings: Abscess and erythema present. No rash.             Comments: 0.5cm firm abscess, extending area of induration 1.5 cm, with surrounding erythema.  No drainage. No central fluctuance.    Neurological:      General: No focal deficit present.      Mental Status: He is alert and oriented to person, place, and time.      GCS: GCS eye subscore is 4. GCS verbal subscore is 5. GCS motor subscore is 6.   Psychiatric:         Mood and Affect: Mood normal.         Speech: Speech normal.         Behavior: Behavior normal.         Thought Content: Thought content normal.         Judgment: Judgment normal.         Assessment/Plan:   1. Abscess  - sulfamethoxazole-trimethoprim (BACTRIM DS) 800-160 MG tablet; Take 1 Tablet by mouth 2 times a day for 10 days.  Dispense: 20 Tablet; Refill: 0  -Clean with soap and water.  -Warm compress.  -OTC Tylenol or ibuprofen for pain.     The patient is alerted " to watch for any signs of infection (redness, red streaking, accumulation of pus, pain, increased swelling, chills or fever) and follow up if such occurs.     Can stop antibiotic in 7 days if symptoms resolve. No central fluctuance. Discussed indications for I&D. Use warm compresses and return for fluid collection, worsening symptoms.    Differential diagnosis, natural history, supportive care, and indications for immediate follow-up discussed.

## 2021-11-09 ASSESSMENT — ENCOUNTER SYMPTOMS: FEVER: 0

## 2021-11-10 ENCOUNTER — OFFICE VISIT (OUTPATIENT)
Dept: MEDICAL GROUP | Facility: MEDICAL CENTER | Age: 41
End: 2021-11-10
Payer: COMMERCIAL

## 2021-11-10 VITALS
SYSTOLIC BLOOD PRESSURE: 138 MMHG | WEIGHT: 212.4 LBS | DIASTOLIC BLOOD PRESSURE: 74 MMHG | BODY MASS INDEX: 31.46 KG/M2 | HEIGHT: 69 IN | OXYGEN SATURATION: 96 % | TEMPERATURE: 99 F | HEART RATE: 69 BPM

## 2021-11-10 DIAGNOSIS — F51.01 PRIMARY INSOMNIA: ICD-10-CM

## 2021-11-10 DIAGNOSIS — G47.30 SLEEP APNEA, UNSPECIFIED TYPE: ICD-10-CM

## 2021-11-10 DIAGNOSIS — K62.5 RECTAL BLEEDING: ICD-10-CM

## 2021-11-10 DIAGNOSIS — K64.9 HEMORRHOIDS, UNSPECIFIED HEMORRHOID TYPE: ICD-10-CM

## 2021-11-10 PROBLEM — R93.1 AGATSTON CORONARY ARTERY CALCIUM SCORE LESS THAN 100: Status: ACTIVE | Noted: 2021-11-10

## 2021-11-10 PROCEDURE — 99214 OFFICE O/P EST MOD 30 MIN: CPT | Performed by: PHYSICIAN ASSISTANT

## 2021-11-10 RX ORDER — TRAZODONE HYDROCHLORIDE 50 MG/1
50 TABLET ORAL
Qty: 30 TABLET | Refills: 3 | Status: SHIPPED | OUTPATIENT
Start: 2021-11-10 | End: 2022-07-26

## 2021-11-10 NOTE — PROGRESS NOTES
"Subjective:   Tristen Palmer is a 41 y.o. male here today for insomnia, rectal bleeding and sleep apnea.    Primary insomnia  This is a pleasant 41-year-old male here today to follow-up on his health.  Chronic history of insomnia.  Now all over-the-counter sleep aids are not effective.  Does fall asleep well but will wake up at 3 AM.  Unable to fall back asleep.  Requesting a medication such as Lunesta.  Has not tried that medication in the past.    Rectal bleeding  Chronic condition of intermittent rectal bleeding.  Likely secondary to hemorrhoids as he does have protruding hemorrhoids at times.  Last rectal bleeding was about 2 months ago.  States he goes to the restroom regularly.  Denies any constipation.  Believes alcohol exacerbates hemorrhoids.    Sleep apnea  Was diagnosed several years ago with sleep apnea.  Provided a mouth appliance that he is overused over the past 7 years.  He is trying to get it repaired but it is costly.  Would like to establish with a specialist here in [place].  Prefers a oral appliance versus CPAP or BiPAP.       Current medicines (including changes today)  Current Outpatient Medications   Medication Sig Dispense Refill   • traZODone (DESYREL) 50 MG Tab Take 1 Tablet by mouth at bedtime. 30 Tablet 3     No current facility-administered medications for this visit.     He  has no past medical history on file.    Social History and Family History were reviewed and updated.    ROS   No chest pain, no shortness of breath, no abdominal pain and all other systems were reviewed and are negative.       Objective:     /74 (BP Location: Right arm, Patient Position: Sitting, BP Cuff Size: Adult)   Pulse 69   Temp 37.2 °C (99 °F) (Temporal)   Ht 1.753 m (5' 9\")   Wt 96.3 kg (212 lb 6.4 oz)   SpO2 96%  Body mass index is 31.37 kg/m².   Physical Exam:  Constitutional: Alert, no distress.  Skin: Warm, dry, good turgor, no rashes in visible areas.  Eye: Equal, round and reactive, " conjunctiva clear, lids normal.  ENMT: Lips without lesions, good dentition, oropharynx clear.  Neck: Trachea midline, no masses.   Lymph: No cervical or supraclavicular lymphadenopathy  Respiratory: Unlabored respiratory effort, lungs appear clear, no wheezes.  Cardiovascular: Regular rate and rhythm.  Psych: Alert and oriented x3, normal affect and mood.        Assessment and Plan:   The following treatment plan was discussed    1. Primary insomnia  Chronic condition.  He has exhausted all OTC products so we will try trazodone as directed.  Discussed side effects.  May take up to 100 mg.  If not effective may try doxepin and then a controlled medication such as Ambien.  - traZODone (DESYREL) 50 MG Tab; Take 1 Tablet by mouth at bedtime.  Dispense: 30 Tablet; Refill: 3    2. Sleep apnea, unspecified type  Chronic condition.  Discussed referring to sleep medicine versus ENT.  First though he will need a sleep study to evaluate the severity of his sleep apnea.  Advised that his weight gain can certainly make sleep apnea worse.  Sleep apnea could play a role in his insomnia.  Advised that although surgery is performed for those with sleep apnea likely becomes ineffective over the years and CPAP as needed.  - Referral to Pulmonary and Sleep Medicine  - Referral to ENT    3. Rectal bleeding  Chronic condition likely secondary to hemorrhoids.  Referred to [facility] and Dr. Lew for evaluation and treatment.  Discussed may need a colonoscopy.  - Referral to Gastroenterology    4. Hemorrhoids, unspecified hemorrhoid type  Chronic condition.  Asymptomatic today.  Does not appear to have any triggers except for alcohol.  Referred to [facility] to discuss treatment options.  - Referral to Gastroenterology         Followup: Return if symptoms worsen or fail to improve.    Please note that this dictation was created using voice recognition software. I have made every reasonable attempt to correct obvious errors, but I expect that there are  errors of grammar and possibly content that I did not discover before finalizing the note.

## 2021-11-10 NOTE — ASSESSMENT & PLAN NOTE
This is a pleasant 41-year-old male here today to follow-up on his health.  Chronic history of insomnia.  Now all over-the-counter sleep aids are not effective.  Does fall asleep well but will wake up at 3 AM.  Unable to fall back asleep.  Requesting a medication such as Lunesta.  Has not tried that medication in the past.

## 2021-11-10 NOTE — ASSESSMENT & PLAN NOTE
Chronic condition of intermittent rectal bleeding.  Likely secondary to hemorrhoids as he does have protruding hemorrhoids at times.  Last rectal bleeding was about 2 months ago.  States he goes to the restroom regularly.  Denies any constipation.  Believes alcohol exacerbates hemorrhoids.

## 2021-11-10 NOTE — ASSESSMENT & PLAN NOTE
Was diagnosed several years ago with sleep apnea.  Provided a mouth appliance that he is overused over the past 7 years.  He is trying to get it repaired but it is costly.  Would like to establish with a specialist here in [place].  Prefers a oral appliance versus CPAP or BiPAP.

## 2021-11-15 ENCOUNTER — OFFICE VISIT (OUTPATIENT)
Dept: SLEEP MEDICINE | Facility: MEDICAL CENTER | Age: 41
End: 2021-11-15
Payer: COMMERCIAL

## 2021-11-15 VITALS
WEIGHT: 209 LBS | DIASTOLIC BLOOD PRESSURE: 88 MMHG | RESPIRATION RATE: 16 BRPM | BODY MASS INDEX: 30.96 KG/M2 | HEART RATE: 79 BPM | SYSTOLIC BLOOD PRESSURE: 128 MMHG | OXYGEN SATURATION: 94 % | HEIGHT: 69 IN

## 2021-11-15 DIAGNOSIS — G47.33 OSA (OBSTRUCTIVE SLEEP APNEA): ICD-10-CM

## 2021-11-15 PROCEDURE — 99204 OFFICE O/P NEW MOD 45 MIN: CPT | Performed by: FAMILY MEDICINE

## 2021-11-15 NOTE — PROGRESS NOTES
"       [facility]  Consult Note     Date: 11/15/2021 / Time: 9:24 AM    Patient ID:   Name:             Tristen Palmer   YOB: 1980  Age:                 41 y.o.  male   MRN:               [id number]      Thank you for requesting a sleep medicine consultation on Tristen Palmer at the sleep center. He presents today with the chief complaints of staying asleep, light snoring which cause him to wake up middle of the night. He is referred by [facility] for evaluation and treatment of sleep disorder breathing.  He had a sleep study 6 years ago in [place].  He has been on mandibular advancement device since then.  Initially and it was beneficial however recently has relapse of symptoms.    HISTORY OF PRESENT ILLNESS:       At night,  Tristen Palmer goes to bed around 10 pm on weekdays and around 12 am on the weekends. He gets out of bed at 7-8 am on weekdays and on the weekends. He usually wakes up at 2 am and has hard time falling back asleep.He averages about  7-8 hrs of sleep on a good night and 5 hrs on a bad night. Pt has bad nights about 1 nights per week. He wakes up about 1 times during the night due to room temp orno obvious reason and on average It takes him hours to fall back asleep. He is currently on trazodone 50 mg as a sleep aid. He has been on it for last 3 days. He is aware of snoring and in past he had breathing pauses,gasping and choking in sleep.He denies any symptoms of restless legs syndrome such as an \"urge to move\"  He  legs in the evening or bedtime. He  denies any symptoms of narcolepsy such as sleep paralysis or cataplexy, or any symptoms to suggest parasomnias such as sleep walking or acting out of dreams. He  has not used any medications for the sleep problem.Overall, he does not finds his sleep refreshing. In terms of  excessive daytime sleepiness,  He complains of sleepiness while  at work, while reading however denies while driving. [place] " "sleepiness scale score is 0/24.He does not take regular naps.He drinks about 3 caffeinated beverages per day.      REVIEW OF SYSTEMS:       Constitutional: Denies fevers, Denies weight changes  Eyes: Denies changes in vision, no eye pain  Ears/Nose/Throat/Mouth: + nasal congestion or sore throat   Cardiovascular: Denies chest pain or palpitations   Respiratory: + shortness of breath , Denies cough  Gastrointestinal/Hepatic: Denies abdominal pain, nausea, vomiting  Genitourinary: Denies bladder dysfunction, dysuria or frequency  Musculoskeletal/Rheum: Denies  joint pain and swelling   Skin/Breast: Denies rash  Neurological: Denies headache, confusion, memory loss or focal weakness/parasthesias  Psychiatric: denies mood disorder     Comprehensive review of systems form is reviewed with the patient and is attached in the EMR.     PMH:  has a past medical history of Chickenpox. He also has no past medical history of Persian measles.  MEDS:   Current Outpatient Medications:   •  traZODone (DESYREL) 50 MG Tab, Take 1 Tablet by mouth at bedtime., Disp: 30 Tablet, Rfl: 3  ALLERGIES: No Known Allergies  SURGHX: History reviewed. No pertinent surgical history.  SOCHX:  reports that he has never smoked. He has never used smokeless tobacco. He reports current alcohol use of about 2.4 oz of alcohol per week. He reports that he does not use drugs.   FH:   Family History   Problem Relation Age of Onset   • Sleep Apnea Neg Hx        Physical Exam:  Vitals/ General Appearance:   Weight/BMI: Body mass index is 30.86 kg/m².  /88 (BP Location: Left arm, Patient Position: Sitting, BP Cuff Size: Adult)   Pulse 79   Resp 16   Ht 1.753 m (5' 9\")   Wt 94.8 kg (209 lb)   SpO2 94%   Vitals:    11/15/21 0917   BP: 128/88   BP Location: Left arm   Patient Position: Sitting   BP Cuff Size: Adult   Pulse: 79   Resp: 16   SpO2: 94%   Weight: 94.8 kg (209 lb)   Height: 1.753 m (5' 9\")           Constitutional: Alert, no distress, " well-groomed.  Skin: No rashes in visible areas.  Eye: Round. Conjunctiva clear, lids normal. No icterus.   ENMT: Lips pink without lesions, good dentition, moist mucous membranes. Phonation normal.  Neck: No masses, no thyromegaly. Moves freely without pain.  CV: Pulse as reported by patient  Respiratory: Unlabored respiratory effort, no cough or audible wheeze  Psych: Alert and oriented x3, normal affect and mood.   INVESTIGATIONS:       ASSESSMENT AND PLAN     1. He  has symptoms of Obstructive Sleep Apnea (PAYTON). He  has excessive daytime sleepiness that  interferes with activites of daily living.The pathophysiology of PAYTON and the increased risk of cardiovascular morbidity from untreated PAYTON is discussed in detail with the patient. We have discussed diagnostic options including in-laboratory, attended polysomnography and home sleep testing. We have also discussed treatment options including airway pressurization, reconstructive otolaryngologic surgery, dental appliances and weight management.       Subsequently,treatment options will be discussed based on the diagnostic study. Meanwhile, He is urged to avoid supine sleep, weight gain and alcoholic beverages since all of these can worsen PAYTON. He is cautioned against drowsy driving. If He feels sleepy while driving, He must pull over for a break/nap, rather than persist on the road, in the interest of He own safety and that of others on the road.    Plan  -  He  will be scheduled for an overnight HST to assess sleep related breathing disorder.  HST will be done  without a mandibular advancement device   -  Ok to use dental appliance in meantime.  However due to relapse of symptom it probably needs  readjustment.  If the home sleep study is indicative of severe sleep apnea we will discuss alternative therapy.   However patient is hesitant to use CPAP machine.    2. The evolution of psychophysiological insomnia is discussed in detail. The importance of cognitive  restructuring and behavior modification therapy is emphasized for long-term improvement rather than the use of hypnotic agents, which may offer short term relief but may lead to the development of tolerance and side effects with prolonged use. Evening exercise, wind-down before bedtime, and stimulus control (leaving the bed when unable to fall back asleep at night) are explained.       Plan   - Handouts on stimulus control and sleep hygiene are given and a couple of titles of books on insomnia are recommended, written by behavioral psychologists.    -Reassessment after the adjustment of his mandibular advancement device.  In meantime he can continue using trazodone as needed.  Okay to use 1 to 2 tablets at night as needed.  However recommended drug holidays to prevent tolerance and dependency      3. Regarding treatment of other past medical problems and general health maintenance,  He is urged to follow up with PCP.

## 2021-11-15 NOTE — PATIENT INSTRUCTIONS
Sleep Apnea  Sleep apnea is a condition in which breathing pauses or becomes shallow during sleep. Episodes of sleep apnea usually last 10 seconds or longer, and they may occur as many as 20 times an hour. Sleep apnea disrupts your sleep and keeps your body from getting the rest that it needs. This condition can increase your risk of certain health problems, including:  · Heart attack.  · Stroke.  · Obesity.  · Diabetes.  · Heart failure.  · Irregular heartbeat.  What are the causes?  There are three kinds of sleep apnea:  · Obstructive sleep apnea. This kind is caused by a blocked or collapsed airway.  · Central sleep apnea. This kind happens when the part of the brain that controls breathing does not send the correct signals to the muscles that control breathing.  · Mixed sleep apnea. This is a combination of obstructive and central sleep apnea.  The most common cause of this condition is a collapsed or blocked airway. An airway can collapse or become blocked if:  · Your throat muscles are abnormally relaxed.  · Your tongue and tonsils are larger than normal.  · You are overweight.  · Your airway is smaller than normal.  What increases the risk?  You are more likely to develop this condition if you:  · Are overweight.  · Smoke.  · Have a smaller than normal airway.  · Are elderly.  · Are male.  · Drink alcohol.  · Take sedatives or tranquilizers.  · Have a family history of sleep apnea.  What are the signs or symptoms?  Symptoms of this condition include:  · Trouble staying asleep.  · Daytime sleepiness and tiredness.  · Irritability.  · Loud snoring.  · Morning headaches.  · Trouble concentrating.  · Forgetfulness.  · Decreased interest in sex.  · Unexplained sleepiness.  · Mood swings.  · Personality changes.  · Feelings of depression.  · Waking up often during the night to urinate.  · Dry mouth.  · Sore throat.  How is this diagnosed?  This condition may be diagnosed with:  · A medical history.  · A physical  exam.  · A series of tests that are done while you are sleeping (sleep study). These tests are usually done in a sleep lab, but they may also be done at home.  How is this treated?  Treatment for this condition aims to restore normal breathing and to ease symptoms during sleep. It may involve managing health issues that can affect breathing, such as high blood pressure or obesity. Treatment may include:  · Sleeping on your side.  · Using a decongestant if you have nasal congestion.  · Avoiding the use of depressants, including alcohol, sedatives, and narcotics.  · Losing weight if you are overweight.  · Making changes to your diet.  · Quitting smoking.  · Using a device to open your airway while you sleep, such as:  ? An oral appliance. This is a custom-made mouthpiece that shifts your lower jaw forward.  ? A continuous positive airway pressure (CPAP) device. This device blows air through a mask when you breathe out (exhale).  ? A nasal expiratory positive airway pressure (EPAP) device. This device has valves that you put into each nostril.  ? A bi-level positive airway pressure (BPAP) device. This device blows air through a mask when you breathe in (inhale) and breathe out (exhale).  · Having surgery if other treatments do not work. During surgery, excess tissue is removed to create a wider airway.  It is important to get treatment for sleep apnea. Without treatment, this condition can lead to:  · High blood pressure.  · Coronary artery disease.  · In men, an inability to achieve or maintain an erection (impotence).  · Reduced thinking abilities.  Follow these instructions at home:  Lifestyle  · Make any lifestyle changes that your health care provider recommends.  · Eat a healthy, well-balanced diet.  · Take steps to lose weight if you are overweight.  · Avoid using depressants, including alcohol, sedatives, and narcotics.  · Do not use any products that contain nicotine or tobacco, such as cigarettes,  e-cigarettes, and chewing tobacco. If you need help quitting, ask your health care provider.  General instructions  · Take over-the-counter and prescription medicines only as told by your health care provider.  · If you were given a device to open your airway while you sleep, use it only as told by your health care provider.  · If you are having surgery, make sure to tell your health care provider you have sleep apnea. You may need to bring your device with you.  · Keep all follow-up visits as told by your health care provider. This is important.  Contact a health care provider if:  · The device that you received to open your airway during sleep is uncomfortable or does not seem to be working.  · Your symptoms do not improve.  · Your symptoms get worse.  Get help right away if:  · You develop:  ? Chest pain.  ? Shortness of breath.  ? Discomfort in your back, arms, or stomach.  · You have:  ? Trouble speaking.  ? Weakness on one side of your body.  ? Drooping in your face.  These symptoms may represent a serious problem that is an emergency. Do not wait to see if the symptoms will go away. Get medical help right away. Call your local emergency services (911 in the [place] Do not drive yourself to the hospital.  Summary  · Sleep apnea is a condition in which breathing pauses or becomes shallow during sleep.  · The most common cause is a collapsed or blocked airway.  · The goal of treatment is to restore normal breathing and to ease symptoms during sleep.  This information is not intended to replace advice given to you by your health care provider. Make sure you discuss any questions you have with your health care provider.  Document Released: 12/08/2003 Document Revised: 10/04/2019 Document Reviewed: 08/13/2019  [employer] Patient Education © 2020 [employer]    Sleep hygiene (ref: UpToDate)  ?Sleep as long as necessary to feel rested (usually seven to eight hours for adults) and then get out of bed  ?Maintain a  regular sleep schedule, particularly a regular wake-up time in the morning  ?Try not to force sleep  ?Avoid caffeinated beverages after lunch  ?Avoid alcohol near bedtime (eg, late afternoon and evening)  ?Avoid smoking or other nicotine intake, particularly during the evening  ?Adjust the bedroom environment as needed to decrease stimuli (eg, reduce ambient light, turn off the television or radio)  ?Avoid use of light-emitting screens  (laptops, tablets, smartphones, ebooks) 2 hours before bedtime   ?Resolve concerns or worries before bedtime  ?Exercise regularly for at least 20 minutes, preferably more than four to five hours prior to bedtime.  ?Avoid daytime naps, especially if they are longer than 20 to 30 minutes or occur late in the day    Stimulus control (Ref: UpToDate)    Patients with insomnia may associate their bed and bedroom with the fear of not sleeping or other arousing events, rather than the more pleasurable anticipation of sleep. The longer one stays in bed trying to sleep, the stronger the association becomes. This perpetuates the difficulty falling asleep.Stimulus control therapy is a strategy whose purpose is to disrupt this association by enhancing the likelihood of sleep . Patients should not go to bed until they are sleepy and should use the bed primarily for sleep (and not for reading, watching television, eating, or worrying). They should not spend more than 20 minutes in bed awake. If they are awake after 20 minutes, they should leave the bedroom and engage in a relaxing activity, such as reading or listening to soothing music. Patients should not engage in activities that stimulate them or reward them for being awake in the middle of the night, such as eating or watching television. In addition, they should not return to bed until they are tired and feel ready to sleep. If they return to bed and still cannot sleep within 20 minutes, the process should be repeated. An alarm should be set  to wake the patient at the same time every morning, including weekends. Daytime naps are not allowed.

## 2021-11-16 ENCOUNTER — HOSPITAL ENCOUNTER (OUTPATIENT)
Dept: LAB | Facility: MEDICAL CENTER | Age: 41
End: 2021-11-16
Attending: PHYSICIAN ASSISTANT
Payer: COMMERCIAL

## 2021-11-16 DIAGNOSIS — Z00.00 PREVENTATIVE HEALTH CARE: ICD-10-CM

## 2021-11-16 DIAGNOSIS — R53.82 CHRONIC FATIGUE: ICD-10-CM

## 2021-11-16 LAB
ALBUMIN SERPL BCP-MCNC: 4.6 G/DL (ref 3.2–4.9)
ALBUMIN/GLOB SERPL: 1.6 G/DL
ALP SERPL-CCNC: 60 U/L (ref 30–99)
ALT SERPL-CCNC: 41 U/L (ref 2–50)
ANION GAP SERPL CALC-SCNC: 10 MMOL/L (ref 7–16)
AST SERPL-CCNC: 29 U/L (ref 12–45)
BASOPHILS # BLD AUTO: 0.8 % (ref 0–1.8)
BASOPHILS # BLD: 0.04 K/UL (ref 0–0.12)
BILIRUB SERPL-MCNC: 0.3 MG/DL (ref 0.1–1.5)
BUN SERPL-MCNC: 16 MG/DL (ref 8–22)
CALCIUM SERPL-MCNC: 9.4 MG/DL (ref 8.5–10.5)
CHLORIDE SERPL-SCNC: 102 MMOL/L (ref 96–112)
CHOLEST SERPL-MCNC: 298 MG/DL (ref 100–199)
CO2 SERPL-SCNC: 27 MMOL/L (ref 20–33)
CREAT SERPL-MCNC: 1.17 MG/DL (ref 0.5–1.4)
EOSINOPHIL # BLD AUTO: 0.08 K/UL (ref 0–0.51)
EOSINOPHIL NFR BLD: 1.6 % (ref 0–6.9)
ERYTHROCYTE [DISTWIDTH] IN BLOOD BY AUTOMATED COUNT: 42.5 FL (ref 35.9–50)
EST. AVERAGE GLUCOSE BLD GHB EST-MCNC: 111 MG/DL
FASTING STATUS PATIENT QL REPORTED: NORMAL
GLOBULIN SER CALC-MCNC: 2.8 G/DL (ref 1.9–3.5)
GLUCOSE SERPL-MCNC: 109 MG/DL (ref 65–99)
HBA1C MFR BLD: 5.5 % (ref 4–5.6)
HCT VFR BLD AUTO: 50.6 % (ref 42–52)
HDLC SERPL-MCNC: 47 MG/DL
HGB BLD-MCNC: 16.3 G/DL (ref 14–18)
IMM GRANULOCYTES # BLD AUTO: 0.01 K/UL (ref 0–0.11)
IMM GRANULOCYTES NFR BLD AUTO: 0.2 % (ref 0–0.9)
LDLC SERPL CALC-MCNC: 214 MG/DL
LYMPHOCYTES # BLD AUTO: 1.6 K/UL (ref 1–4.8)
LYMPHOCYTES NFR BLD: 31.6 % (ref 22–41)
MCH RBC QN AUTO: 29.5 PG (ref 27–33)
MCHC RBC AUTO-ENTMCNC: 32.2 G/DL (ref 33.7–35.3)
MCV RBC AUTO: 91.5 FL (ref 81.4–97.8)
MONOCYTES # BLD AUTO: 0.51 K/UL (ref 0–0.85)
MONOCYTES NFR BLD AUTO: 10.1 % (ref 0–13.4)
NEUTROPHILS # BLD AUTO: 2.83 K/UL (ref 1.82–7.42)
NEUTROPHILS NFR BLD: 55.7 % (ref 44–72)
NRBC # BLD AUTO: 0 K/UL
NRBC BLD-RTO: 0 /100 WBC
PLATELET # BLD AUTO: 264 K/UL (ref 164–446)
PMV BLD AUTO: 9 FL (ref 9–12.9)
POTASSIUM SERPL-SCNC: 4.2 MMOL/L (ref 3.6–5.5)
PROT SERPL-MCNC: 7.4 G/DL (ref 6–8.2)
RBC # BLD AUTO: 5.53 M/UL (ref 4.7–6.1)
SODIUM SERPL-SCNC: 139 MMOL/L (ref 135–145)
TRIGL SERPL-MCNC: 187 MG/DL (ref 0–149)
TSH SERPL DL<=0.005 MIU/L-ACNC: 2.08 UIU/ML (ref 0.38–5.33)
WBC # BLD AUTO: 5.1 K/UL (ref 4.8–10.8)

## 2021-11-16 PROCEDURE — 80061 LIPID PANEL: CPT

## 2021-11-16 PROCEDURE — 84443 ASSAY THYROID STIM HORMONE: CPT

## 2021-11-16 PROCEDURE — 83036 HEMOGLOBIN GLYCOSYLATED A1C: CPT

## 2021-11-16 PROCEDURE — 85025 COMPLETE CBC W/AUTO DIFF WBC: CPT

## 2021-11-16 PROCEDURE — 36415 COLL VENOUS BLD VENIPUNCTURE: CPT

## 2021-11-16 PROCEDURE — 80053 COMPREHEN METABOLIC PANEL: CPT

## 2021-11-19 DIAGNOSIS — L02.91 ABSCESS: ICD-10-CM

## 2021-11-19 RX ORDER — SULFAMETHOXAZOLE AND TRIMETHOPRIM 800; 160 MG/1; MG/1
1 TABLET ORAL 2 TIMES DAILY
Qty: 20 TABLET | Refills: 0 | Status: SHIPPED | OUTPATIENT
Start: 2021-11-19 | End: 2021-11-29

## 2021-11-24 ENCOUNTER — TELEPHONE (OUTPATIENT)
Dept: SLEEP MEDICINE | Facility: MEDICAL CENTER | Age: 41
End: 2021-11-24

## 2021-11-24 NOTE — TELEPHONE ENCOUNTER
Patient called state [facility] states they have not received our order from 11/15/21. I do not seen faxed confirmation in the media tab. I spoke Dr Cobos's medical assistant Melly and she states she will run down the order and if needed to be redone or sent to [facility] she will do so. She will call patient back with update.   Message routed as requested.   I also called [facility] to verify order was not received on their end it was NOT.

## 2021-11-24 NOTE — TELEPHONE ENCOUNTER
Completed a new form for [facility] and had Dr. Moore sign because he is in the office and all the needed information was sent as well. Called pt to inform him that the order was faxed to [facility] and advise him to give them a call on Monday to verify if they receive order.

## 2021-12-02 DIAGNOSIS — Z30.09 VASECTOMY EVALUATION: ICD-10-CM

## 2022-01-03 ENCOUNTER — PATIENT MESSAGE (OUTPATIENT)
Dept: SLEEP MEDICINE | Facility: MEDICAL CENTER | Age: 42
End: 2022-01-03

## 2022-01-03 DIAGNOSIS — G47.33 OSA (OBSTRUCTIVE SLEEP APNEA): ICD-10-CM

## 2022-01-04 NOTE — PATIENT COMMUNICATION
Called pt to schedule HST that was order per Dr. Cobos and pt informed me that he would like to complete an in lab SS instead. I informed the pt that I will forward this message to Dr. Cobos and once he has place the order I will contact the pt to schedule the testing.  Please sign pending order for in lab SS

## 2022-01-04 NOTE — TELEPHONE ENCOUNTER
From: Tristen Palmer  To: Physician Nuzhat Cobos  Sent: 1/3/2022 3:53 PM PST  Subject: Need to schedule a sleep study, at your facility    I originally wanted to go through [facility]. They never call me back, and I called them 4 times now. I would like to just do the sleep test at your facility. I might want to go with a CPAP machine.. who knows. Please advise on next steps for scheduling?

## 2022-01-24 ENCOUNTER — APPOINTMENT (OUTPATIENT)
Dept: SLEEP MEDICINE | Facility: MEDICAL CENTER | Age: 42
End: 2022-01-24
Attending: FAMILY MEDICINE
Payer: COMMERCIAL

## 2022-02-14 ENCOUNTER — SLEEP STUDY (OUTPATIENT)
Dept: SLEEP MEDICINE | Facility: MEDICAL CENTER | Age: 42
End: 2022-02-14
Attending: FAMILY MEDICINE
Payer: COMMERCIAL

## 2022-02-14 DIAGNOSIS — G47.33 OSA (OBSTRUCTIVE SLEEP APNEA): ICD-10-CM

## 2022-02-14 PROCEDURE — 95811 POLYSOM 6/>YRS CPAP 4/> PARM: CPT | Performed by: FAMILY MEDICINE

## 2022-02-15 ENCOUNTER — TELEPHONE (OUTPATIENT)
Dept: SLEEP MEDICINE | Facility: MEDICAL CENTER | Age: 42
End: 2022-02-15

## 2022-02-15 NOTE — TELEPHONE ENCOUNTER
Called pt to notify him that he left his  here at the sleep clinic. He said that he will come by later tonight to pick it up.

## 2022-02-16 NOTE — PROCEDURES
MONTAGE: Standard  STUDY TYPE: Diagnostic  RECORDING TECHNIQUE:   After the scalp was prepared, gold plated electrodes were applied to the scalp according to the International 10-20 System. EEG (electroencephalogram) was continuously monitored from the O1-M2, O2-M1, C3-M2, C4-M1, F3-M2, and F4-M1. EOGs (electrooculograms) were monitored by electrodes placed at the left and right outer canthi. Chin EMG (electromyogram) was monitored by electrodes placed on the mentalis and sub-mentalis muscles. Nasal and oral airflow were monitored using a triple port thermocouple as well as oronasal pressure transducer. Respiratory effort was measured by inductive plethysmography technology employing abdominal and thoracic belts. Blood oxygen saturation and pulse were monitored by pulse oximetry. Heart rhythm was monitored by surface electrocardiogram. Leg EMG was studied using surface electrodes placed on left and right anterior tibialis. A microphone was used to monitor tracheal sounds and snoring. Body position was monitored and documented by technician observation.   SCORING CRITERIA:   A modification of the AASM manual for scoring of sleep and associated events was used. Obstructive apneas were scored by cessation of airflow for at least 10 seconds with continuing respiratory effort. Central apneas were scored by cessation of airflow for at least 10 seconds with no respiratory effort. Hypopneas were scored by a 30% or more reduction in airflow for at least 10 seconds accompanied by arterial oxygen desaturation of 3% or an arousal. For CMS (Medicare) patients, per AASM rule 1B, hypopneas are scored by 30% with mild reduction in airflow for at least 10 seconds accompanied by arterial saturation decreased at 4%.  Study start time was 11:12:37 PM. Diagnostic recording time was 6h 27.5m with a total sleep time of 3h 20.5m resulting in a sleep efficiency of 51.74%%. Sleep latency from the start of the study was 25 minutes and the  latency from sleep to REM was 00 minutes. In total,92 arousals were scored for an arousal index of 27.5.  Respiratory:  There were a total of 38 apneas consisting of 0 obstructive apneas, 0 mixed apneas, and 38 central apneas. A total of 48 hypopneas were scored. The apnea index was 11.37 per hour and the hypopnea index was 14.36 per hour resulting in an overall AHI of 25.74. AHI during rem was 0.0 and AHI while supine was 112.17.  44% of the events were central in nature  Oximetry:  There was a mean oxygen saturation of 91.0%. The minimum oxygen saturation in NREM was 80.0 % and in REM was --. The patient spent 69.9 minutes of TST with SaO2 <88%.  Cardiac:  The highest heart rate seen while awake was 107 BPM while the highest heart rate during sleep was 92 BPM with an average sleeping heart rate of 73 BPM.  Limb Movements:  There were a total of 91 PLMs during sleep which resulted in a PLMS index of 27.2. Of these, 34 were associated with arousals which resulted in a PLMS arousal index of 10.2.    Impression:  1.  Moderate complex sleep apnea with AHI of 25.7 /hr and O2 evangelista 80%  2.  Sleep-related hypoxia      Recommendations:  I recommend that the patient should return for a CPAP/BiPAP titration due to the severity of sleep disordered breathing and sleep-related hypoxia. In some cases alternative treatment options may be proven effective in resolving sleep apnea. These options include upper airway surgery, the use of a dental orthotic, weight loss orpositional therapy. Clinical correlation is required. In general patients with sleep apnea are advised to avoid alcohol, sedatives and not to operate a motor vehicle while drowsy.  Untreated sleep apnea increases the risk for cardiovascular and neurovascular disease.

## 2022-02-23 ENCOUNTER — PATIENT MESSAGE (OUTPATIENT)
Dept: SLEEP MEDICINE | Facility: MEDICAL CENTER | Age: 42
End: 2022-02-23
Payer: COMMERCIAL

## 2022-02-28 ENCOUNTER — PATIENT MESSAGE (OUTPATIENT)
Dept: SLEEP MEDICINE | Facility: MEDICAL CENTER | Age: 42
End: 2022-02-28
Payer: COMMERCIAL

## 2022-02-28 DIAGNOSIS — G47.31 COMPLEX SLEEP APNEA SYNDROME: ICD-10-CM

## 2022-02-28 DIAGNOSIS — G47.33 OSA (OBSTRUCTIVE SLEEP APNEA): ICD-10-CM

## 2022-02-28 RX ORDER — ZOLPIDEM TARTRATE 10 MG/1
10 TABLET ORAL NIGHTLY PRN
Qty: 2 TABLET | Refills: 0 | Status: SHIPPED | OUTPATIENT
Start: 2022-02-28 | End: 2022-03-01

## 2022-03-16 ENCOUNTER — OFFICE VISIT (OUTPATIENT)
Dept: SLEEP MEDICINE | Facility: MEDICAL CENTER | Age: 42
End: 2022-03-16
Payer: COMMERCIAL

## 2022-03-16 VITALS
RESPIRATION RATE: 16 BRPM | HEIGHT: 69 IN | SYSTOLIC BLOOD PRESSURE: 124 MMHG | DIASTOLIC BLOOD PRESSURE: 72 MMHG | BODY MASS INDEX: 31.25 KG/M2 | OXYGEN SATURATION: 93 % | HEART RATE: 83 BPM | WEIGHT: 211 LBS

## 2022-03-16 DIAGNOSIS — G47.31 COMPLEX SLEEP APNEA SYNDROME: ICD-10-CM

## 2022-03-16 PROCEDURE — 99214 OFFICE O/P EST MOD 30 MIN: CPT | Performed by: FAMILY MEDICINE

## 2022-03-16 ASSESSMENT — FIBROSIS 4 INDEX: FIB4 SCORE: 0.7

## 2022-03-16 ASSESSMENT — PATIENT HEALTH QUESTIONNAIRE - PHQ9: CLINICAL INTERPRETATION OF PHQ2 SCORE: 0

## 2022-03-28 DIAGNOSIS — G47.30 SLEEP APNEA, UNSPECIFIED TYPE: ICD-10-CM

## 2022-03-30 NOTE — PROGRESS NOTES
[facility] Follow Up Note     Date: 3/29/2022 / Time: 7:14 PM    Patient ID:   Name:             Tristen Palmer   YOB: 1980  Age:                 42 y.o.  male   MRN:               [id number]      Thank you for requesting a sleep medicine consultation on Tristen Palmer at the sleep center. He presents today with the chief complaints of PAYTON and SS follow up.     HISTORY OF PRESENT ILLNESS:       Denies any chnages in sleep hx.  He denies any symptoms of RLS, narcolepsy or any symptoms to suggest parasomnias such as nightmares, sleep walking or acting out of dreams. Since his last visit he had a PSG which showed Moderate complex sleep apnea with AHI of 25.7 /hr and O2 evangelista 80%. The patient spent 69.9 minutes of TST with SaO2 <88%    REVIEW OF SYSTEMS:       Constitutional: Denies fevers, Denies weight changes  Eyes: Denies changes in vision, no eye pain  Ears/Nose/Throat/Mouth: Denies nasal congestion or sore throat   Cardiovascular: Denies chest pain or palpitations   Respiratory: Denies shortness of breath , Denies cough  Gastrointestinal/Hepatic: Denies abdominal pain, nausea, vomiting, diarrhea, constipation or GI bleeding   Genitourinary: Deniesdysuria or frequency  Musculoskeletal/Rheum: Denies  joint pain and swelling   Skin/Breast: Denies rash,   Neurological: Denies headache, confusion, memory loss or focal weakness/parasthesias  Psychiatric: denies mood disorder     Comprehensive review of systems form is reviewed with the patient and is attached in the EMR.     PMH:  has a past medical history of Chickenpox.    He has no past medical history of [place] measles.  MEDS:   Current Outpatient Medications:   •  traZODone (DESYREL) 50 MG Tab, Take 1 Tablet by mouth at bedtime., Disp: 30 Tablet, Rfl: 3  ALLERGIES: No Known Allergies  SURGHX: No past surgical history on file.  SOCHX:  reports that he has never smoked. He has never used smokeless tobacco. He reports current  "alcohol use of about 2.4 oz of alcohol per week. He reports that he does not use drugs..  FH:   Family History   Problem Relation Age of Onset   • Sleep Apnea Neg Hx          Physical Exam:  Vitals/ General Appearance:   Weight/BMI: Body mass index is 31.16 kg/m².  /72 (BP Location: Left arm, Patient Position: Sitting, BP Cuff Size: Adult)   Pulse 83   Resp 16   Ht 1.753 m (5' 9\")   Wt 95.7 kg (211 lb)   SpO2 93%   Vitals:    03/16/22 1021   BP: 124/72   BP Location: Left arm   Patient Position: Sitting   BP Cuff Size: Adult   Pulse: 83   Resp: 16   SpO2: 93%   Weight: 95.7 kg (211 lb)   Height: 1.753 m (5' 9\")       Pt. is alert and oriented to time, place and person. Cooperative and in no apparent distress.       Constitutional: Alert, no distress, well-groomed.  Skin: No rashes in visible areas.  Eye: Round. Conjunctiva clear, lids normal. No icterus.   ENMT: Lips pink without lesions, good dentition, moist mucous membranes. Phonation normal.  Neck: No masses, no thyromegaly. Moves freely without pain.  CV: Pulse as reported by patient  Respiratory: Unlabored respiratory effort, no cough or audible wheeze  Psych: Alert and oriented x3, normal affect and mood.     ASSESSMENT AND PLAN     1.Obstructive Sleep Apnea .   He is urged to avoid supine sleep, weight gain and alcoholic beverages since all of these can worsen sleep apnea. He is cautioned against drowsy driving. If He feels sleepy while driving, He must pull over for a break/nap, rather than persist on the road, in the interest of He own safety and that of others on the road.   Plan   - Auto CPAP vs overnight CPAP titration vs dental appliance and surgeries was dicussed in detail. After informed discussion patient will buy the BiPAP out-of-pocket.  However he also would like to try dental appliance therefore refferal is placed today.  He will go through the insurance to receive a mandibular advancement device.   - F/u in 8-10 weeks to assess the " efficiacy of recommended pressure    - PSG was reviewed and discussed with the pt   - Compliance was reinforced     2. Regarding treatment of other past medical problems and general health maintenance,  He is urged to follow up with PCP.

## 2022-04-01 ENCOUNTER — TELEPHONE (OUTPATIENT)
Dept: SLEEP MEDICINE | Facility: MEDICAL CENTER | Age: 42
End: 2022-04-01
Payer: COMMERCIAL

## 2022-04-01 NOTE — TELEPHONE ENCOUNTER
VOICEMAIL  1. Caller Name: [facility]                      Call Back Number: [phone]    2. Message: Voicemail received 3/28/22 - needs referral for 3/29 appt - please fax to [phone]    3. Patient approves office to leave a detailed voicemail/MyChart message: N\A

## 2022-05-03 ENCOUNTER — TELEPHONE (OUTPATIENT)
Dept: SLEEP MEDICINE | Facility: MEDICAL CENTER | Age: 42
End: 2022-05-03
Payer: COMMERCIAL

## 2022-05-03 DIAGNOSIS — G47.33 OSA (OBSTRUCTIVE SLEEP APNEA): ICD-10-CM

## 2022-05-03 NOTE — TELEPHONE ENCOUNTER
Received document from Dr. Betty Landry from [facility] of orofaical pain requesting a Rx/Detailed Written order (DWO) signed by the referring physician ( M.D or D.O. only ). The paper work was place in Dr. Cobos in box.

## 2022-05-04 NOTE — TELEPHONE ENCOUNTER
Paperwork is scan into media and it was emailed directly to Dr. Betty Landry  At [facility] for Orofacial pain

## 2022-05-09 ENCOUNTER — PATIENT MESSAGE (OUTPATIENT)
Dept: SLEEP MEDICINE | Facility: MEDICAL CENTER | Age: 42
End: 2022-05-09
Payer: COMMERCIAL

## 2022-07-25 ENCOUNTER — TELEPHONE (OUTPATIENT)
Dept: MEDICAL GROUP | Facility: MEDICAL CENTER | Age: 42
End: 2022-07-25

## 2022-07-26 ENCOUNTER — OFFICE VISIT (OUTPATIENT)
Dept: MEDICAL GROUP | Facility: MEDICAL CENTER | Age: 42
End: 2022-07-26
Payer: COMMERCIAL

## 2022-07-26 VITALS
HEART RATE: 77 BPM | OXYGEN SATURATION: 97 % | HEIGHT: 69 IN | WEIGHT: 211.2 LBS | TEMPERATURE: 98.5 F | DIASTOLIC BLOOD PRESSURE: 80 MMHG | SYSTOLIC BLOOD PRESSURE: 126 MMHG | BODY MASS INDEX: 31.28 KG/M2

## 2022-07-26 DIAGNOSIS — A63.0 GENITAL WARTS: ICD-10-CM

## 2022-07-26 DIAGNOSIS — L91.8 MULTIPLE ACQUIRED SKIN TAGS: ICD-10-CM

## 2022-07-26 DIAGNOSIS — Z00.00 ANNUAL PHYSICAL EXAM: ICD-10-CM

## 2022-07-26 DIAGNOSIS — R53.82 CHRONIC FATIGUE: ICD-10-CM

## 2022-07-26 PROCEDURE — 99396 PREV VISIT EST AGE 40-64: CPT | Performed by: PHYSICIAN ASSISTANT

## 2022-07-26 ASSESSMENT — FIBROSIS 4 INDEX: FIB4 SCORE: 0.7

## 2022-07-26 NOTE — PROGRESS NOTES
"Subjective:   Tristen Palmer is a 42 y.o. male here today for annual physical.    Annual physical exam  This is a pleasant 42-year-old male here today for an annual physical.  Complains of scrotal skin tags.  He does have a history of genital warts.  Would like to follow back up with his dermatologist in [place] [facility].  Dr. Nye.  Saw a proctologist for his history of rectal bleeding secondary to hemorrhoids.  Hemorrhoids have not been a bother recently.  History of sleep apnea.  Is currently wearing a mouthguard which is effective in helping him sleep at nighttime.  Is currently being seen at a men's health clinic for testosterone replacement therapy.  States that his ranges of testosterone are above 700.  He follows up with them weekly.  Does get labs performed on a monthly basis.  Sleeping better with a mouthguard but still has issues with insomnia.  Does not take trazodone any longer release does not take it on a regular basis.       Current medicines (including changes today)  No current outpatient medications on file.     No current facility-administered medications for this visit.     He  has a past medical history of Chickenpox.    He has no past medical history of Malt[place] measles.    Social History and Family History were reviewed and updated.    ROS   No chest pain, no shortness of breath, no abdominal pain and all other systems were reviewed and are negative.       Objective:     /80 (BP Location: Left arm, Patient Position: Sitting, BP Cuff Size: Large adult)   Pulse 77   Temp 36.9 °C (98.5 °F) (Temporal)   Ht 1.753 m (5' 9\")   Wt 95.8 kg (211 lb 3.2 oz)   SpO2 97%  Body mass index is 31.19 kg/m².   Physical Exam:  Constitutional: Alert, no distress.  Skin: Warm, dry, good turgor, no rashes in visible areas.  Eye: Equal, round and reactive, conjunctiva clear, lids normal.  ENMT: Lips without lesions, good dentition, oropharynx clear.  Neck: Trachea midline, no masses. "   Lymph: No cervical or supraclavicular lymphadenopathy  Respiratory: Unlabored respiratory effort, lungs appear clear, no wheezes.  Psych: Alert and oriented x3, normal affect and mood.        Assessment and Plan:   The following treatment plan was discussed    1. Annual physical exam  Ordered labs.  Fast 8 hours.  Will check cholesterol profile.  Discussed previous results of his cholesterol as well as the CT calcium score.  Declined any referral to our genetics study for familial hyperlipidemia.  - CBC WITH DIFFERENTIAL; Future  - HEMOGLOBIN A1C; Future  - Lipid Profile; Future  - Comp Metabolic Panel; Future    2. Multiple acquired skin tags  New condition noted in chart but chronic.  Discussed use of over-the-counter Dr. Chavira's wart off.  May be effective with the tags if used as directed.  Did refer to dermatology.  - Referral to Dermatology    3. Genital warts  Chronic condition.  Refer to dermatology for further evaluation.  He is unsure if he has any genital warts currently.  Least he has not noticed them on the shaft of his penis.  - Referral to Dermatology    4. Chronic fatigue  Chronic condition.  Improved on testosterone placement therapy.  Continue with follow-up with men's clinic.         Followup: Return in about 6 months (around 1/26/2023), or if symptoms worsen or fail to improve.    Please note that this dictation was created using voice recognition software. I have made every reasonable attempt to correct obvious errors, but I expect that there are errors of grammar and possibly content that I did not discover before finalizing the note.

## 2022-07-27 DIAGNOSIS — A63.0 GENITAL WARTS: ICD-10-CM

## 2022-07-27 DIAGNOSIS — L91.8 MULTIPLE ACQUIRED SKIN TAGS: ICD-10-CM

## 2022-09-06 ENCOUNTER — APPOINTMENT (RX ONLY)
Dept: URBAN - METROPOLITAN AREA CLINIC 31 | Facility: CLINIC | Age: 42
Setting detail: DERMATOLOGY
End: 2022-09-06

## 2022-09-06 DIAGNOSIS — L81.4 OTHER MELANIN HYPERPIGMENTATION: ICD-10-CM

## 2022-09-06 DIAGNOSIS — L82.1 OTHER SEBORRHEIC KERATOSIS: ICD-10-CM

## 2022-09-06 DIAGNOSIS — Z71.89 OTHER SPECIFIED COUNSELING: ICD-10-CM

## 2022-09-06 DIAGNOSIS — D22 MELANOCYTIC NEVI: ICD-10-CM

## 2022-09-06 DIAGNOSIS — L91.8 OTHER HYPERTROPHIC DISORDERS OF THE SKIN: ICD-10-CM

## 2022-09-06 DIAGNOSIS — D18.0 HEMANGIOMA: ICD-10-CM

## 2022-09-06 PROBLEM — D18.01 HEMANGIOMA OF SKIN AND SUBCUTANEOUS TISSUE: Status: ACTIVE | Noted: 2022-09-06

## 2022-09-06 PROBLEM — D22.5 MELANOCYTIC NEVI OF TRUNK: Status: ACTIVE | Noted: 2022-09-06

## 2022-09-06 PROCEDURE — 99213 OFFICE O/P EST LOW 20 MIN: CPT | Mod: 25

## 2022-09-06 PROCEDURE — ? SKIN TAG REMOVAL

## 2022-09-06 PROCEDURE — 11200 RMVL SKIN TAGS UP TO&INC 15: CPT

## 2022-09-06 PROCEDURE — ? COUNSELING

## 2022-09-06 ASSESSMENT — LOCATION DETAILED DESCRIPTION DERM
LOCATION DETAILED: RIGHT INFERIOR MEDIAL SCROTUM
LOCATION DETAILED: RIGHT INFERIOR UPPER BACK
LOCATION DETAILED: RIGHT SUPERIOR UPPER BACK
LOCATION DETAILED: LEFT INFERIOR MEDIAL SCROTUM
LOCATION DETAILED: RIGHT LATERAL UPPER BACK

## 2022-09-06 ASSESSMENT — LOCATION ZONE DERM
LOCATION ZONE: TRUNK
LOCATION ZONE: SCROTUM

## 2022-09-06 ASSESSMENT — LOCATION SIMPLE DESCRIPTION DERM
LOCATION SIMPLE: LEFT SCROTUM
LOCATION SIMPLE: RIGHT SCROTUM
LOCATION SIMPLE: RIGHT UPPER BACK

## 2022-09-06 NOTE — PROCEDURE: SKIN TAG REMOVAL
Consent: Written consent obtained and the risks of skin tag removal was reviewed with the patient including but not limited to bleeding, pigmentary change, infection, pain, and remote possibility of scarring.
Hemostasis: Silver Nitrate
Detail Level: Detailed
Anesthesia Type: 1% lidocaine with epinephrine
Include Z78.9 (Other Specified Conditions Influencing Health Status) As An Associated Diagnosis?: Yes
Medical Necessity Information: It is in your best interest to select a reason for this procedure from the list below. All of these items fulfill various CMS LCD requirements except the new and changing color options.
Anesthesia Volume In Cc: 0.1
Medical Necessity Clause: This procedure was medically necessary because the lesions that were treated were:

## 2022-12-22 ENCOUNTER — OFFICE VISIT (OUTPATIENT)
Dept: SLEEP MEDICINE | Facility: MEDICAL CENTER | Age: 42
End: 2022-12-22
Payer: COMMERCIAL

## 2022-12-22 VITALS
WEIGHT: 220 LBS | DIASTOLIC BLOOD PRESSURE: 74 MMHG | HEART RATE: 83 BPM | OXYGEN SATURATION: 96 % | SYSTOLIC BLOOD PRESSURE: 126 MMHG | HEIGHT: 68 IN | BODY MASS INDEX: 33.34 KG/M2

## 2022-12-22 DIAGNOSIS — G47.31 COMPLEX SLEEP APNEA SYNDROME: ICD-10-CM

## 2022-12-22 PROBLEM — F51.01 PRIMARY INSOMNIA: Status: RESOLVED | Noted: 2017-10-12 | Resolved: 2022-12-22

## 2022-12-22 PROCEDURE — 99213 OFFICE O/P EST LOW 20 MIN: CPT | Performed by: NURSE PRACTITIONER

## 2022-12-22 RX ORDER — ZOLPIDEM TARTRATE 5 MG/1
5 TABLET ORAL NIGHTLY PRN
Qty: 2 TABLET | Refills: 0 | Status: SHIPPED | OUTPATIENT
Start: 2022-12-22 | End: 2022-12-23

## 2022-12-22 ASSESSMENT — FIBROSIS 4 INDEX: FIB4 SCORE: 0.7

## 2022-12-22 NOTE — PROGRESS NOTES
Chief Complaint   Patient presents with    Apnea     Complex sleep apnea- Last seen 03/16/2022       HPI:      Mr. Rose is a 43 y/o male patient who is in today for CSA f/u. PMH includes seasonal affective disorder, mixed hyperlipidemia, complex sleep apnea, never smoker.    Patient completed PSG split-night study in [place] around 2013 where he was diagnosed with PAYTON but opted to move forward with a dental appliance.  He has been on a dental appliance since 2013.patient states he obtained a new dental appliance around July 2022 through Dr. Landry at [facility] for Orofacial pain.  He reports that he sleeps much better and does not snore when he uses the dental appliance.  He did however experience about a week or 2 ago an issue where he felt that back of his throat and sinuses were swollen and he struggled with sleep due to snoring.  He felt the dental appliance at the time was not as effective.  He goes to bed at 12 am and wakes up at 8 am.  He denies any issues with insomnia and states he has no trouble initiating and maintaining sleep.  He does not utilize sleep aids.  He denies morning headache.  He stays active by lifting weights but hopes to start more cardio.  He denies any new health problems or medications.    Sleep Study History:   PSG study (without dental appliance) from 2/14/22 indicated moderate complex sleep apnea with AHI of 25.7 /hr and O2 evangelista 80%. 44% of the events were central in nature. AHI during rem was 0.0 and AHI while supine was 112.17. Sleep-related hypoxia. There was a mean oxygen saturation of 91.0%. The minimum oxygen saturation in NREM was 80.0 % and in REM was --. The patient spent 69.9 minutes of TST with SaO2 <88%. Titration study was recommended.     Patient completed PSG split-night study in [place] around 2013 where he was diagnosed with PAYTON but opted to move forward with a dental appliance.  He has been on a dental appliance since  2013.    ROS:    Constitutional: Denies fevers, Denies weight changes  Eyes: Denies changes in vision, no eye pain  Ears/Nose/Throat/Mouth: Denies nasal congestion or sore throat   Cardiovascular: Denies chest pain or palpitations   Respiratory: Denies shortness of breath , Denies cough  Gastrointestinal/Hepatic: Denies abdominal pain, nausea, vomiting,   Skin/Breast: Denies rash,   Neurological: Denies headache, confusion,   Psychiatric: denies mood disorder   Sleep: denies snoring       Past Medical History:   Diagnosis Date    Chickenpox        Past Surgical History:   Procedure Laterality Date    VASECTOMY         Family History   Problem Relation Age of Onset    Sleep Apnea Neg Hx        Social History     Socioeconomic History    Marital status:      Spouse name: Not on file    Number of children: Not on file    Years of education: Not on file    Highest education level: Not on file   Occupational History    Not on file   Tobacco Use    Smoking status: Never    Smokeless tobacco: Never   Vaping Use    Vaping Use: Never used   Substance and Sexual Activity    Alcohol use: Yes     Alcohol/week: 2.4 oz     Types: 4 Glasses of wine per week     Comment: 2 times a week     Drug use: No     Comment: past marijuana, MDMA, mushrooms,and coke-in college    Sexual activity: Yes     Partners: Female     Comment: , GF, one child (son)   Other Topics Concern    Not on file   Social History Narrative    Not on file     Social Determinants of Health     Financial Resource Strain: Not on file   Food Insecurity: Not on file   Transportation Needs: Not on file   Physical Activity: Not on file   Stress: Not on file   Social Connections: Not on file   Intimate Partner Violence: Not on file   Housing Stability: Not on file       Allergies as of 12/22/2022    (No Known Allergies)        Vitals:  Vitals:    12/22/22 0859   BP: 126/74   Pulse: 83   SpO2: 96%       Current medications as of today   Current Outpatient  Medications   Medication Sig Dispense Refill    zolpidem (AMBIEN) 5 MG Tab Take 1 Tablet by mouth at bedtime as needed for Sleep (take on the night of the sleep study, may repeat once before midnight.) for up to 1 day. 2 Tablet 0     No current facility-administered medications for this visit.         Physical Exam: Limited by COVID-19 precautions.  Appearance: Well developed, well nourished, no acute distress  Eyes: PERRL, EOM intact, sclera white, conjunctiva moist  Ears: no lesions or deformities  Hearing: grossly intact  Nose: no lesions or deformities  Respiratory effort: no intercostal retractions or use of accessory muscles  Extremities: no cyanosis or edema  Abdomen: soft   Gait and Station: normal  Digits and nails: no clubbing, cyanosis, petechiae or nodes.  Cranial nerves: grossly intact  Skin: no visible rashes, lesions or ulcers noted  Orientation: Oriented to time, person and place  Mood and affect: mood and affect appropriate, normal interaction with examiner  Judgement: Intact    Assessment:  1. Complex sleep apnea syndrome  zolpidem (AMBIEN) 5 MG Tab    Polysomnography Titration    CANCELED: Polysomnography Titration      2. BMI 33.0-33.9,adult  Patient identified as having weight management issue.  Appropriate orders and counseling given.            Plan  Discussed the cardiovascular and neuropsychiatric risks of untreated CSA; including but not limited to: HTN, DM, MI, ASCVD, CVA, CHF, traffic accidents.     1. PSG study (without dental appliance) from 2/14/22 indicated moderate complex sleep apnea with AHI of 25.7 /hr and O2 evangelista 80%. 44% of the events were central in nature. AHI during rem was 0.0 and AHI while supine was 112.17. Sleep-related hypoxia. There was a mean oxygen saturation of 91.0%. The minimum oxygen saturation in NREM was 80.0 % and in REM was --. The patient spent 69.9 minutes of TST with SaO2 <88%.   Recommendation:  Recommend that the patient should return for a CPAP/BiPAP  titration due to the severity of sleep disordered breathing and sleep-related hypoxia.    *Patient is amenable to titration study.  Order PSG CPAP/BiPAP titration.  Patient is advised to not use the dental appliance.  Ambien 5 mg #2 is sent electronically to pharmacy to utilize during sleep study if needed.  We will review study results in person; however should titration reveal the patient may need an ASV titration will message via [employer] to schedule ASV titration, and reschedule follow-up.    *We will consider in the future PSG study with dental appliance in place to assess efficacy of dental appliance.    *Sleep hygiene discussed. Recommend keeping a set sleep/wake schedule. Logging enough hours of sleep. Limiting/Avoiding naps. No caffeine after noon and no heavy meals in the evening.     2. Continue to stay active.    If your BMI is 25-29.9 you are overweight. If your BMI is 30 or greater you are obese. To lose weight eat less, move more, or both. Any diet that reduces caloric intake can help with weight loss. Extra weight may reduce your lifespan. Avoid dramatic unsustainable dietary changes that result in the yo-yo effect (down then back up.) Usually small modifications in diet and exercise are easier to stick with.  3. Follow up with appropriate healthcare providers for all other medical problems.  4. F/u in 6 weeks for CSA and sleep study results.       LUIS Javier.      This dictation was created using voice recognition software. The accuracy of the dictation is limited to the abilities of the software. I expect there may be some errors of grammar and possibly content.

## 2023-02-16 ENCOUNTER — SLEEP STUDY (OUTPATIENT)
Dept: SLEEP MEDICINE | Facility: MEDICAL CENTER | Age: 43
End: 2023-02-16
Attending: NURSE PRACTITIONER
Payer: COMMERCIAL

## 2023-02-16 DIAGNOSIS — G47.31 COMPLEX SLEEP APNEA SYNDROME: ICD-10-CM

## 2023-02-16 PROCEDURE — 95811 POLYSOM 6/>YRS CPAP 4/> PARM: CPT | Performed by: STUDENT IN AN ORGANIZED HEALTH CARE EDUCATION/TRAINING PROGRAM

## 2023-02-17 NOTE — PROCEDURES
MONTAGE: Standard  STUDY TYPE: Treatment    RECORDING TECHNIQUE:   After the scalp was prepared, gold plated electrodes were applied to the scalp according to the International 10-20 System. EEG (electroencephalogram) was continuously monitored from the O1-M2, O2-M1, C3-M2, C4-M1, F3-M2, and F4-M1. EOGs (electrooculograms) were monitored by electrodes placed at the left and right outer canthi. Chin EMG (electromyogram) was monitored by electrodes placed on the mentalis and sub-mentalis muscles. Nasal and oral airflow were monitored using a triple port thermocouple as well as oronasal pressure transducer. Respiratory effort was measured by inductive plethysmography technology employing abdominal and thoracic belts. Blood oxygen saturation and pulse were monitored by pulse oximetry. Heart rhythm was monitored by surface electrocardiogram. Leg EMG was studied using surface electrodes placed on left and right anterior tibialis. A microphone was used to monitor tracheal sounds and snoring. Body position was monitored and documented by technician observation.   SCORING CRITERIA:   A modification of the AASM manual for scoring of sleep and associated events was used. Obstructive apneas were scored by cessation of airflow for at least 10 seconds with continuing respiratory effort. Central apneas were scored by cessation of airflow for at least 10 seconds with no respiratory effort. Hypopneas were scored by a 30% or more reduction in airflow for at least 10 seconds accompanied by arterial oxygen desaturation of 3% or an arousal. For CMS (Medicare) patients, per AASM rule 1B, hypopneas are scored by 30% with mild reduction in airflow for at least 10 seconds accompanied by arterial saturation decreased at 4%.    Study start time was 10:14:18 PM. Diagnostic recording time was 7h 51.0m with a total sleep time of 7h 15.5m resulting in a sleep efficiency of 92.46%%. Sleep latency from the start of the study was 00 minutes and the  latency from sleep to REM was 54 minutes. In total,56 arousals were scored for an arousal index of 7.7.  Respiratory:  There were a total of 37 apneas consisting of 0 obstructive apneas, 0 mixed apneas, and 37 central apneas. A total of 51 hypopneas were scored. The apnea index was 5.10 per hour and the hypopnea index was 7.03 per hour resulting in an overall AHI of 12.12. AHI during REM was 8.5 and AHI while supine was 12.68.  Central apneas accounted for 42% of respiratory events.  Oximetry:  There was a mean oxygen saturation of 93.0%. The minimum oxygen saturation in NREM was 82.0 % and in REM was 87.0%. The patient spent 2.8 minutes of TST with SaO2 <88%.  Cardiac:  The highest heart rate seen while awake was 96 BPM while the highest heart rate during sleep was 89 BPM with an average sleeping heart rate of 63 BPM.  Limb Movements:  There were a total of 0 PLMs during sleep which resulted in a PLMS index of 0.0. Of these, 5 were associated with arousals which resulted in a PLMS arousal index of 0.7.  Titration: CPAP was tried from 5-9cm H2O.  This was a fully attended sleep study. This test was technically adequate. This patient was titrated on CPAP starting at 5 cm of water pressure. Patient was titrated up to 9 cm of water pressure. Patient did best at 9 cm of water pressure. Patient spent 57 minutes at that pressure and their AHI was 1.0 which is considered treated obstructive sleep apnea.     Impression:  1.  Titration study was done on CPAP only  2.  Sleep apnea improved with increase in PAP pressures  3.  No supine REM sleep was seen during night of study  4.  Oxygen saturations averaged 93%, only 2.8 minutes was spent at or below 88% saturation    Recommendations:  I recommend auto CPAP of 6-10 cm .  Patient used a small DreamWear mask during night of study.     I also recommend 30 day compliance download to assess the efficacy to the recommended pressure, measure leak, apnea hypopnea index and compliance  for further outpatient monitoring and management of PAP therapy. In some cases alternative treatment options may be proven effective in resolving sleep apnea. These options include upper airway surgery, the use of a dental orthotic, weight loss, or positional therapy. Clinical correlation is required. In general patients with sleep apnea are advised to avoid alcohol, sedatives and not to operate a motor vehicle while drowsy.  Untreated sleep apnea increases the risk for cardiovascular and neurovascular disease.

## 2023-03-15 ENCOUNTER — TELEPHONE (OUTPATIENT)
Dept: SLEEP MEDICINE | Facility: MEDICAL CENTER | Age: 43
End: 2023-03-15
Payer: COMMERCIAL

## 2023-03-15 NOTE — TELEPHONE ENCOUNTER
3-15-23  1st NO SHOW  Date of No Show: 3-14-23  Provider: Ronda Morrow  Reason For Visit: FV/POLY RESULTS  Outcome of call:  Appt rescheduled with patient  Reason Missed: Pt stated he thought the appt was canceled

## 2023-03-17 ENCOUNTER — OFFICE VISIT (OUTPATIENT)
Dept: SLEEP MEDICINE | Facility: MEDICAL CENTER | Age: 43
End: 2023-03-17
Attending: NURSE PRACTITIONER
Payer: COMMERCIAL

## 2023-03-17 VITALS
BODY MASS INDEX: 31.25 KG/M2 | OXYGEN SATURATION: 94 % | WEIGHT: 211 LBS | DIASTOLIC BLOOD PRESSURE: 62 MMHG | RESPIRATION RATE: 16 BRPM | SYSTOLIC BLOOD PRESSURE: 126 MMHG | HEIGHT: 69 IN | HEART RATE: 72 BPM

## 2023-03-17 DIAGNOSIS — G47.33 OSA (OBSTRUCTIVE SLEEP APNEA): ICD-10-CM

## 2023-03-17 PROCEDURE — 99214 OFFICE O/P EST MOD 30 MIN: CPT | Performed by: NURSE PRACTITIONER

## 2023-03-17 PROCEDURE — 99212 OFFICE O/P EST SF 10 MIN: CPT | Performed by: NURSE PRACTITIONER

## 2023-03-17 RX ORDER — ZOLPIDEM TARTRATE 5 MG/1
TABLET ORAL
COMMUNITY
Start: 2023-02-07

## 2023-03-17 ASSESSMENT — PATIENT HEALTH QUESTIONNAIRE - PHQ9: CLINICAL INTERPRETATION OF PHQ2 SCORE: 0

## 2023-03-17 ASSESSMENT — FIBROSIS 4 INDEX: FIB4 SCORE: 0.72

## 2023-03-17 NOTE — PATIENT INSTRUCTIONS
"  DME : [facility]  Address: [address] [place] [address] #[place] [address]  Phone: [phone]    Once you receive your new PAP machine from the [employer] company you're referred to, we must see you back for an office visit between 30-90 days of you using the machine to review compliance.  If you were ordered an ASV machine, we need to see you in office no sooner than your 60th day on therapy.     This is a very important time frame for insurance purposes. If you do not follow up with our office for compliance your insurance may not continue to pay for the machine. Also if you do not use the machine for at least 4 hours each night, you may be deemed \"Incompliant\", in which case the insurance may also not continue to pay for the machine.    If you are incompliant, you may have to surrender your machine to the DME company and start the process over if you wish to continue therapy after that. Meaning a new office consult and new sleep studies.    For your first visit back with our office, please bring the whole machine with the power cord. We will download the compliance off the SD card in the machine, and are able to make changes if need be in office. Some machines have a modem and we can access the data wirelessly, if this is the case please make sure the [employer] company grants us access to your machine.  For all follow up appointments after that, you will only need to bring the SD card to the appointment.    If you are having any issues with the mask, you have a 30 day window to exchange and try something else with your DME company.  If you are having issues with the pressure, please call our office at [phone].  These issues can cause you to not be able to use machine appropriately, there for make you incompliant.    Please call our office if you have any questions.    Thank you, [facility].  [phone]  "

## 2023-03-17 NOTE — PROGRESS NOTES
Chief Complaint   Patient presents with    Apnea     SS results       HPI:  Tristen Palmer is a 43 y.o. year old male here today for follow-up on PAYTON with titration study results.  Last seen 12/22/2022 by VIANCA Javier. PMH includes seasonal affective disorder, mixed hyperlipidemia, complex sleep apnea, never smoker.  Initial presentation was on 11/15/2022 with complaints of difficulty staying asleep, light snoring which caused him to wake up in the middle of the night.    Patient completed PSG split-night study in [place] around 2013 where he was diagnosed with PAYTON but opted to move forward with a dental appliance.  He has been on a dental appliance since 2013.patient states he obtained a new dental appliance around July 2022 through Dr. Landry at [facility] for Orofacial pain.  He reports that he sleeps much better and does not snore when he uses the dental appliance.     He goes to bed at 12 am and wakes up at 8 am.  He denies any issues with insomnia and states he has no trouble initiating and maintaining sleep.  He does not utilize sleep aids.  He denies morning headache.  He stays active by lifting weights but hopes to start more cardio.  He denies any new health problems or medications.    At last visit, sleep study was reviewed and patient decided to move forward with a titration study in the interest of obtaining a CPAP device.    Titration study-without dental appliance (2/16/2023):  1.  Titration study was done on CPAP only  2.  Sleep apnea improved with increase in PAP pressures  3.  No supine REM sleep was seen during night of study  4.  Oxygen saturations averaged 93%, only 2.8 minutes was spent at or below 88% saturation     Recommendations:  I recommend auto CPAP of 6-10 cm .  Patient used a small DreamWear mask during night of study.       Sleep Study History:   PSG study (without dental appliance) from 2/14/22 indicated moderate complex sleep apnea with AHI of 25.7 /hr and O2  "evangelista 80%. 44% of the events were central in nature. AHI during rem was 0.0 and AHI while supine was 112.17. Sleep-related hypoxia. There was a mean oxygen saturation of 91.0%. The minimum oxygen saturation in NREM was 80.0 % and in REM was --. The patient spent 69.9 minutes of TST with SaO2 <88%. Titration study was recommended.     ROS: As per HPI and otherwise negative if not stated.    Past Medical History:   Diagnosis Date    Chickenpox        Past Surgical History:   Procedure Laterality Date    VASECTOMY         Family History   Problem Relation Age of Onset    Sleep Apnea Neg Hx        Allergies as of 03/17/2023    (No Known Allergies)        Vitals:  /62 (BP Location: Left arm, Patient Position: Sitting, BP Cuff Size: Adult)   Pulse 72   Resp 16   Ht 1.753 m (5' 9\")   Wt 95.7 kg (211 lb)   SpO2 94%     Current medications as of today   Current Outpatient Medications   Medication Sig Dispense Refill    zolpidem (AMBIEN) 5 MG Tab PLEASE SEE ATTACHED FOR DETAILED DIRECTIONS (Patient not taking: Reported on 3/17/2023)       No current facility-administered medications for this visit.         Physical Exam:   Gen:           Alert and oriented, No apparent distress. Mood and affect appropriate, normal interaction with examiner.  Eyes:          PERRL, EOM intact, sclere white, conjunctive moist.  Ears:          Not examined.   Hearing:     Grossly intact.  Nose:          Normal, no lesions or deformities.  Dentition:    Good dentition.  Oropharynx:   Tongue normal, posterior pharynx without erythema or exudate.  Neck:        Supple, trachea midline, no masses.  Respiratory Effort: No intercostal retractions or use of accessory muscles.   Lung Auscultation:      Clear to auscultation bilaterally; no rales, rhonchi or wheezing.  CV:            Regular rate and rhythm. No murmurs, rubs or gallops.  Abd:           Not examined.   Lymphadenopathy: Not examined.  Gait and Station: Normal.  Digits and " Nails: No clubbing, cyanosis, petechiae, or nodes.   Cranial Nerves: II-XII grossly intact.  Skin:        No rashes, lesions or ulcers noted.               Ext:           No cyanosis or edema.      Assessment:  1. PAYTON (obstructive sleep apnea)  DME CPAP          Plan:   I reviewed with the patient the pathophysiology of obstructive sleep apnea, as well as potential cardiac and neurologic risks associated with untreated sleep apnea including CAD, HTN, pulmonary arterial hypertension, cardiac arrhythmias, heart attack or stroke.  PAYTON patient's have increased risk of motor vehicle accidents, DM type II, chronic kidney disease and nonalcoholic liver disease.  He is cautioned against driving while sleepy for his safety and safety of others on the road. We reviewed treatment modalities for sleep apnea including CPAP/BiPAP therapy, ENT referral, dental appliance.      After review of titration study and informed discussion, an order for auto CPAP will be placed today.  Patient advised to follow-up within 90 days for 30-day compliance review.      Please note that this dictation was created using voice recognition software. I have made every reasonable attempt to correct obvious errors, but it is possible there are errors of grammar and possibly content that I did not discover before finalizing the note.

## 2023-04-06 ENCOUNTER — PATIENT MESSAGE (OUTPATIENT)
Dept: SLEEP MEDICINE | Facility: MEDICAL CENTER | Age: 43
End: 2023-04-06

## 2023-04-06 DIAGNOSIS — G47.31 COMPLEX SLEEP APNEA SYNDROME: ICD-10-CM

## 2023-05-10 NOTE — PROGRESS NOTES
Order to decrease autoCPAP to 4-8 cmH2O due to pressure intolerance and per patient's request is placed. Would recommend increasing back to 6-10 cmH2O as patient acclimates to therapy. Also Turn EPR ON and set to 3.     LUIS Javier.

## 2023-05-11 NOTE — PATIENT COMMUNICATION
Order for the pressure change was sent to [employer] [facility] and I change the pressure wirelessly on resmed as well.

## 2023-07-25 ENCOUNTER — APPOINTMENT (OUTPATIENT)
Dept: MEDICAL GROUP | Facility: MEDICAL CENTER | Age: 43
End: 2023-07-25
Payer: COMMERCIAL

## 2023-08-07 ENCOUNTER — TELEPHONE (OUTPATIENT)
Dept: SLEEP MEDICINE | Facility: MEDICAL CENTER | Age: 43
End: 2023-08-07
Payer: COMMERCIAL

## 2023-08-07 DIAGNOSIS — G47.31 COMPLEX SLEEP APNEA SYNDROME: ICD-10-CM

## 2023-08-07 DIAGNOSIS — G47.33 OSA (OBSTRUCTIVE SLEEP APNEA): ICD-10-CM

## 2023-08-07 NOTE — TELEPHONE ENCOUNTER
Carly from [facility] called stating that the pt had an apt with her for mask fitting to try a new mask but the pt informed her that he is still struggling with the pressure on his machine and that it hurts his lung when he tries to breath out and his chest. The pt has not been able to use the machine a lot because of this. Carly stated that she attempted the pressure of 4-6 and the pt stated that pressure does not hurt his lung or chest. Carly is wondering one of the providers in the office would approve the new pressure and send her an order. Compliance report is scan into media, please advise.

## 2023-08-08 NOTE — TELEPHONE ENCOUNTER
The order for the pressure change  was sent to luis at [employer] with all the needed information.

## 2023-10-16 DIAGNOSIS — L91.8 MULTIPLE ACQUIRED SKIN TAGS: ICD-10-CM

## 2023-10-16 DIAGNOSIS — A63.0 GENITAL WARTS: ICD-10-CM

## 2023-10-16 DIAGNOSIS — B35.1 ONYCHOMYCOSIS: ICD-10-CM

## 2023-10-16 DIAGNOSIS — D22.9 ATYPICAL NEVUS: ICD-10-CM

## 2023-10-31 ENCOUNTER — APPOINTMENT (RX ONLY)
Dept: URBAN - METROPOLITAN AREA CLINIC 31 | Facility: CLINIC | Age: 43
Setting detail: DERMATOLOGY
End: 2023-10-31

## 2023-10-31 DIAGNOSIS — D22 MELANOCYTIC NEVI: ICD-10-CM

## 2023-10-31 DIAGNOSIS — Z71.89 OTHER SPECIFIED COUNSELING: ICD-10-CM

## 2023-10-31 DIAGNOSIS — D18.0 HEMANGIOMA: ICD-10-CM

## 2023-10-31 DIAGNOSIS — L91.8 OTHER HYPERTROPHIC DISORDERS OF THE SKIN: ICD-10-CM

## 2023-10-31 DIAGNOSIS — L81.4 OTHER MELANIN HYPERPIGMENTATION: ICD-10-CM

## 2023-10-31 PROBLEM — D22.5 MELANOCYTIC NEVI OF TRUNK: Status: ACTIVE | Noted: 2023-10-31

## 2023-10-31 PROBLEM — D18.01 HEMANGIOMA OF SKIN AND SUBCUTANEOUS TISSUE: Status: ACTIVE | Noted: 2023-10-31

## 2023-10-31 PROCEDURE — ? COUNSELING

## 2023-10-31 PROCEDURE — 99213 OFFICE O/P EST LOW 20 MIN: CPT | Mod: 25

## 2023-10-31 PROCEDURE — 11200 RMVL SKIN TAGS UP TO&INC 15: CPT

## 2023-10-31 PROCEDURE — ? SKIN TAG REMOVAL

## 2023-10-31 ASSESSMENT — LOCATION ZONE DERM
LOCATION ZONE: TRUNK
LOCATION ZONE: LEG
LOCATION ZONE: GENITALIA
LOCATION ZONE: PERINEUM

## 2023-10-31 ASSESSMENT — LOCATION SIMPLE DESCRIPTION DERM
LOCATION SIMPLE: PERINEUM
LOCATION SIMPLE: RIGHT THIGH
LOCATION SIMPLE: RIGHT UPPER BACK

## 2023-10-31 ASSESSMENT — LOCATION DETAILED DESCRIPTION DERM
LOCATION DETAILED: RIGHT SUPERIOR UPPER BACK
LOCATION DETAILED: RIGHT MEDIAL UPPER BACK
LOCATION DETAILED: PERINEUM
LOCATION DETAILED: RIGHT MEDIAL POSTERIOR THIGH
LOCATION DETAILED: RIGHT INFERIOR UPPER BACK

## 2023-10-31 NOTE — PROCEDURE: SKIN TAG REMOVAL
Include Z78.9 (Other Specified Conditions Influencing Health Status) As An Associated Diagnosis?: Yes
Anesthesia Type: 1% lidocaine with epinephrine
Anesthesia Volume In Cc: 0.1
Medical Necessity Clause: This procedure was medically necessary because the lesions that were treated were:
Consent: Written consent obtained and the risks of skin tag removal was reviewed with the patient including but not limited to bleeding, pigmentary change, infection, pain, and remote possibility of scarring.
Medical Necessity Information: It is in your best interest to select a reason for this procedure from the list below. All of these items fulfill various CMS LCD requirements except the new and changing color options.
Detail Level: Detailed
Hemostasis: Silver Nitrate

## 2024-08-13 ENCOUNTER — PATIENT MESSAGE (OUTPATIENT)
Dept: HEALTH INFORMATION MANAGEMENT | Facility: OTHER | Age: 44
End: 2024-08-13

## 2024-08-20 ENCOUNTER — TELEPHONE (OUTPATIENT)
Dept: HEALTH INFORMATION MANAGEMENT | Facility: OTHER | Age: 44
End: 2024-08-20
Payer: COMMERCIAL

## 2024-12-05 ENCOUNTER — TELEPHONE (OUTPATIENT)
Dept: HEALTH INFORMATION MANAGEMENT | Facility: OTHER | Age: 44
End: 2024-12-05
Payer: COMMERCIAL

## 2025-04-16 ENCOUNTER — OFFICE VISIT (OUTPATIENT)
Dept: MEDICAL GROUP | Facility: MEDICAL CENTER | Age: 45
End: 2025-04-16
Attending: FAMILY MEDICINE
Payer: COMMERCIAL

## 2025-04-16 VITALS
DIASTOLIC BLOOD PRESSURE: 74 MMHG | HEART RATE: 54 BPM | HEIGHT: 69 IN | OXYGEN SATURATION: 96 % | WEIGHT: 209.8 LBS | SYSTOLIC BLOOD PRESSURE: 102 MMHG | BODY MASS INDEX: 31.07 KG/M2

## 2025-04-16 DIAGNOSIS — L91.8 MULTIPLE ACQUIRED SKIN TAGS: ICD-10-CM

## 2025-04-16 DIAGNOSIS — E66.811 OBESITY (BMI 30.0-34.9): ICD-10-CM

## 2025-04-16 DIAGNOSIS — E78.2 MIXED HYPERLIPIDEMIA: ICD-10-CM

## 2025-04-16 PROBLEM — F33.40 SEASONAL AFFECTIVE DISORDER IN REMISSION (HCC): Status: ACTIVE | Noted: 2017-02-10

## 2025-04-16 PROCEDURE — 3074F SYST BP LT 130 MM HG: CPT | Performed by: NURSE PRACTITIONER

## 2025-04-16 PROCEDURE — 1126F AMNT PAIN NOTED NONE PRSNT: CPT | Performed by: NURSE PRACTITIONER

## 2025-04-16 PROCEDURE — 3078F DIAST BP <80 MM HG: CPT | Performed by: NURSE PRACTITIONER

## 2025-04-16 PROCEDURE — G0439 PPPS, SUBSEQ VISIT: HCPCS | Performed by: NURSE PRACTITIONER

## 2025-04-16 SDOH — ECONOMIC STABILITY: TRANSPORTATION INSECURITY: IN THE PAST 12 MONTHS, HAS LACK OF TRANSPORTATION KEPT YOU FROM MEDICAL APPOINTMENTS OR FROM GETTING MEDICATIONS?: NO

## 2025-04-16 SDOH — ECONOMIC STABILITY: HOUSING INSECURITY: IN THE LAST 12 MONTHS, WAS THERE A TIME WHEN YOU WERE NOT ABLE TO PAY THE MORTGAGE OR RENT ON TIME?: NO

## 2025-04-16 SDOH — ECONOMIC STABILITY: FOOD INSECURITY: WITHIN THE PAST 12 MONTHS, THE FOOD YOU BOUGHT JUST DIDN'T LAST AND YOU DIDN'T HAVE MONEY TO GET MORE.: NEVER TRUE

## 2025-04-16 SDOH — ECONOMIC STABILITY: HOUSING INSECURITY: AT ANY TIME IN THE PAST 12 MONTHS, WERE YOU HOMELESS OR LIVING IN A SHELTER (INCLUDING NOW)?: NO

## 2025-04-16 SDOH — ECONOMIC STABILITY: FOOD INSECURITY: WITHIN THE PAST 12 MONTHS, YOU WORRIED THAT YOUR FOOD WOULD RUN OUT BEFORE YOU GOT THE MONEY TO BUY MORE.: NEVER TRUE

## 2025-04-16 SDOH — ECONOMIC STABILITY: FOOD INSECURITY: HOW HARD IS IT FOR YOU TO PAY FOR THE VERY BASICS LIKE FOOD, HOUSING, MEDICAL CARE, AND HEATING?: NOT HARD AT ALL

## 2025-04-16 ASSESSMENT — ENCOUNTER SYMPTOMS: GENERAL WELL-BEING: GOOD

## 2025-04-16 ASSESSMENT — ACTIVITIES OF DAILY LIVING (ADL)
BATHING_REQUIRES_ASSISTANCE: 0
LACK_OF_TRANSPORTATION: NO

## 2025-04-16 ASSESSMENT — PAIN SCALES - GENERAL: PAINLEVEL_OUTOF10: NO PAIN

## 2025-04-16 ASSESSMENT — PATIENT HEALTH QUESTIONNAIRE - PHQ9: CLINICAL INTERPRETATION OF PHQ2 SCORE: 0

## 2025-04-16 NOTE — ASSESSMENT & PLAN NOTE
Ongoing. Patient reports skin tags in perineal area under scrotum which began approx 2 years ago. However recently tags causing severe discomfort when running. Patient requests a referral to dermatology. Patient reports soonest available appt with PCP is 2 - 3 months out. Co morbidity per chart review genital warts. Cont f/u with dermatology Referral sent

## 2025-04-16 NOTE — ASSESSMENT & PLAN NOTE
Stable  Records review    Latest Reference Range & Units 11/16/21 08:10   Cholesterol,Tot 100 - 199 mg/dL 298 (H)   Triglycerides 0 - 149 mg/dL 187 (H)   HDL >=40 mg/dL 47   LDL <100 mg/dL 214 (H)   (H): Data is abnormally high.  Managed with diet and exercise. CT Crdiac Score ( 12/4/2019) 0.0  Cont f/u with PCP for ongoing monitoring

## 2025-04-16 NOTE — ASSESSMENT & PLAN NOTE
Stable. BMI 30.98 Cont heart healthy diet and regular exercise. Cont f/u with PCP for ongoing monitoring and discussion

## 2025-04-16 NOTE — PROGRESS NOTES
Comprehensive Health Assessment Program     Tristen Palmer is a 45 y.o. here for his comprehensive health assessment.    Patient Active Problem List    Diagnosis Date Noted    Obesity (BMI 30.0-34.9) 04/16/2025    Multiple acquired skin tags 07/26/2022    Sleep apnea 11/10/2021    Agatston coronary artery calcium score less than 100 11/10/2021    Annual physical exam 07/26/2021    Rectal bleeding 05/31/2018    Mixed hyperlipidemia 06/05/2017    Atypical nevus 06/05/2017    Onychomycosis 02/10/2017    Seasonal affective disorder in remission (HCC) 02/10/2017    Chronic fatigue 02/10/2017    Genital warts 02/10/2017       No current outpatient medications on file.     No current facility-administered medications for this visit.          Current supplements as per medication list.     Allergies:   Patient has no known allergies.  Social History     Tobacco Use    Smoking status: Never    Smokeless tobacco: Never   Vaping Use    Vaping status: Never Used   Substance Use Topics    Alcohol use: Yes     Alcohol/week: 1.2 oz     Types: 2 Glasses of wine per week    Drug use: Not Currently     Family History   Problem Relation Age of Onset    Sleep Apnea Neg Hx      Tristen  has a past medical history of Chickenpox.    He has no past medical history of [place] measles.   Past Surgical History:   Procedure Laterality Date    VASECTOMY         Screening:      Depression Screening  Little interest or pleasure in doing things?  0 - not at all  Feeling down, depressed , or hopeless? 0 - not at all  Patient Health Questionnaire Score: 0     If depressive symptoms identified deferred to follow up visit unless specifically addressed in assessment and plan.    Interpretation of PHQ-9 Total Score   Score Severity   1-4 No Depression   5-9 Mild Depression   10-14 Moderate Depression   15-19 Moderately Severe Depression   20-27 Severe Depression      Fall Risk Assessment  Has the patient had two or more falls in the last year or any  fall with injury in the last year?  No    Safety Assessment  Do you always wear your seatbelt?  Yes  Any changes to home needed to function safely? No  Difficulty hearing.  No  Patient counseled about all safety risks that were identified.    Functional Assessment ADLs  Are there any barriers preventing you from cooking for yourself or meeting nutritional needs?  No.    Are there any barriers preventing you from driving safely or obtaining transportation?  No.    Are there any barriers preventing you from using a telephone or calling for help?  No    Are there any barriers preventing you from shopping?  No.    Are there any barriers preventing you from taking care of your own finances?  No    Are there any barriers preventing you from managing your medications?  No    Are there any barriers preventing you from showering, bathing or dressing yourself? No    Are there any barriers preventing you from doing housework or laundry? No  Are there any barriers preventing you from using the toilet?No  Are you currently engaging in any exercise or physical activity?  Yes. Weights. Walks 2-3 miles/daily     Self-Assessment of Health  What is your perception of your health? Good    Do you sleep more than six hours a night? No    In the past 7 days, how much did pain keep you from doing your normal work? Some Left Sciatica   Do you spend quality time with family or friends (virtually or in person)? Yes    Do you usually eat a heart healthy diet that constists of a variety of fruits, vegetables, whole grains and fiber? Yes    Do you eat foods high in fat and/or Fast Food more than three times per week? No    How concerned are you that your medical conditions are not being well managed? Not at all    Are you worried that in the next 2 months, you may not have stable housing that you own, rent, or stay in as part of a household? No      Advance Care Planning  Do you have an Advance Directive, Living Will, Durable Power of ,  or POLST? NA                  Health Maintenance Summary            Current Care Gaps       COVID-19 Vaccine (4 - 2024-25 season) Overdue since 9/1/2024 12/06/2021  Imm Admin: PFIZER PURPLE CAP SARS-COV-2 VACCINATION (12+)    05/04/2021  Imm Admin: PFIZER PURPLE CAP SARS-COV-2 VACCINATION (12+)    04/12/2021  Imm Admin: PFIZER PURPLE CAP SARS-COV-2 VACCINATION (12+)              HIV Screening (Once) Never done     No completion history exists for this topic.              Hepatitis C Screening (Once) Never done     No completion history exists for this topic.              Hepatitis B Vaccine (Hep B) (1 of 3 - 19+ 3-dose series) Never done     No completion history exists for this topic.                      Upcoming       Influenza Vaccine (Season Ended) Next due on 9/1/2025 11/20/2018  Imm Admin: Influenza Vaccine Quad Inj (Pf)              IMM DTaP/Tdap/Td Vaccine (2 - Td or Tdap) Next due on 11/20/2028 11/20/2018  Imm Admin: Tdap Vaccine                      Completed or No Longer Recommended       Hepatitis A Vaccine (Hep A) (Series Information) Aged Out     No completion history exists for this topic.              HPV Vaccines (Series Information) Aged Out     No completion history exists for this topic.              Polio Vaccine (Inactivated Polio) (Series Information) Aged Out     No completion history exists for this topic.              Meningococcal Immunization (Series Information) Aged Out     No completion history exists for this topic.              Pneumococcal Vaccine: 0-49 Years (Series Information) Aged Out     No completion history exists for this topic.                            Patient Care Team:  Benito Sales P.A.-C. as PCP - General (Family Medicine)  [employer] as Respiratory Therapist (DME Supplier)      Financial Resource Strain: Low Risk  (4/16/2025)    Overall Financial Resource Strain (CARDIA)     Difficulty of Paying Living Expenses: Not hard at all     "  Transportation Needs: No Transportation Needs (4/16/2025)    PRAPARE - Transportation     Lack of Transportation (Medical): No     Lack of Transportation (Non-Medical): No      Food Insecurity: No Food Insecurity (4/16/2025)    Hunger Vital Sign     Worried About Running Out of Food in the Last Year: Never true     Ran Out of Food in the Last Year: Never true     Intimate Partner Violence: Not on file         Encounter Vitals  Blood Pressure: 102/74  Pulse: (!) 54  Pulse Oximetry: 96 %  Weight: 95.2 kg (209 lb 12.8 oz)  Height: 175.3 cm (5' 9\")  BMI (Calculated): 30.98  Pain Score: No pain     ROS:  No fever, chills, nausea, vomiting, diarrhea, chest pain or shortness of breath. See HPI.    Physical Exam:  Constitutional: NAD  HENMT: NC/AT, OP clear, TM's clear, no lymphadenopathy, no thyromegaly.  No JVD. (-) carotid bruit   Cardiovascular: RRR, No murmur   Lungs: CTAB, bilat   Extremities:  No edema   Skin: No legions notes  Neurologic: Alert & oriented    Assessment and Plan. The following treatment and monitoring plan is recommended:  Multiple acquired skin tags  Ongoing. Patient reports skin tags in perineal area under scrotum which began approx 2 years ago. However recently tags causing severe discomfort when running. Patient requests a referral to dermatology. Patient reports soonest available appt with PCP is 2 - 3 months out. Co morbidity per chart review genital warts. Cont f/u with dermatology Referral sent     Mixed hyperlipidemia  Stable  Records review    Latest Reference Range & Units 11/16/21 08:10   Cholesterol,Tot 100 - 199 mg/dL 298 (H)   Triglycerides 0 - 149 mg/dL 187 (H)   HDL >=40 mg/dL 47   LDL <100 mg/dL 214 (H)   (H): Data is abnormally high.  Managed with diet and exercise. CT Crdiac Score ( 12/4/2019) 0.0  Cont f/u with PCP for ongoing monitoring     Obesity (BMI 30.0-34.9)  Stable. BMI 30.98 Cont heart healthy diet and regular exercise. Cont f/u with PCP for ongoing monitoring and " discussion        Services suggested: No services needed at this time    Follow-up: Return f/u with PCP as indicated.

## 2025-05-21 ENCOUNTER — APPOINTMENT (OUTPATIENT)
Dept: DERMATOLOGY | Facility: IMAGING CENTER | Age: 45
End: 2025-05-21
Payer: COMMERCIAL

## 2025-06-24 ENCOUNTER — APPOINTMENT (OUTPATIENT)
Dept: MEDICAL GROUP | Facility: MEDICAL CENTER | Age: 45
End: 2025-06-24
Payer: COMMERCIAL

## 2025-06-24 VITALS
WEIGHT: 206 LBS | SYSTOLIC BLOOD PRESSURE: 110 MMHG | HEART RATE: 64 BPM | HEIGHT: 68 IN | OXYGEN SATURATION: 97 % | TEMPERATURE: 97.7 F | DIASTOLIC BLOOD PRESSURE: 60 MMHG | BODY MASS INDEX: 31.22 KG/M2

## 2025-06-24 DIAGNOSIS — Z00.00 ANNUAL PHYSICAL EXAM: Primary | ICD-10-CM

## 2025-06-24 DIAGNOSIS — E78.2 MIXED HYPERLIPIDEMIA: ICD-10-CM

## 2025-06-24 DIAGNOSIS — Z12.83 SKIN CANCER SCREENING: ICD-10-CM

## 2025-06-24 DIAGNOSIS — M72.2 PLANTAR FASCIITIS, LEFT: ICD-10-CM

## 2025-06-24 DIAGNOSIS — Z12.5 ENCOUNTER FOR SCREENING PROSTATE SPECIFIC ANTIGEN (PSA) MEASUREMENT: ICD-10-CM

## 2025-06-24 PROBLEM — L91.8 MULTIPLE ACQUIRED SKIN TAGS: Status: RESOLVED | Noted: 2022-07-26 | Resolved: 2025-06-24

## 2025-06-24 PROBLEM — D22.9 ATYPICAL NEVUS: Status: RESOLVED | Noted: 2017-06-05 | Resolved: 2025-06-24

## 2025-06-24 PROCEDURE — 3078F DIAST BP <80 MM HG: CPT | Performed by: PHYSICIAN ASSISTANT

## 2025-06-24 PROCEDURE — 99396 PREV VISIT EST AGE 40-64: CPT | Performed by: PHYSICIAN ASSISTANT

## 2025-06-24 PROCEDURE — 3074F SYST BP LT 130 MM HG: CPT | Performed by: PHYSICIAN ASSISTANT

## 2025-06-24 ASSESSMENT — PATIENT HEALTH QUESTIONNAIRE - PHQ9
6. FEELING BAD ABOUT YOURSELF - OR THAT YOU ARE A FAILURE OR HAVE LET YOURSELF OR YOUR FAMILY DOWN: NOT AL ALL
7. TROUBLE CONCENTRATING ON THINGS, SUCH AS READING THE NEWSPAPER OR WATCHING TELEVISION: NOT AT ALL
9. THOUGHTS THAT YOU WOULD BE BETTER OFF DEAD, OR OF HURTING YOURSELF: NOT AT ALL
5. POOR APPETITE OR OVEREATING: NOT AT ALL
8. MOVING OR SPEAKING SO SLOWLY THAT OTHER PEOPLE COULD HAVE NOTICED. OR THE OPPOSITE, BEING SO FIGETY OR RESTLESS THAT YOU HAVE BEEN MOVING AROUND A LOT MORE THAN USUAL: NOT AT ALL
2. FEELING DOWN, DEPRESSED, IRRITABLE, OR HOPELESS: NOT AT ALL
4. FEELING TIRED OR HAVING LITTLE ENERGY: NOT AT ALL
3. TROUBLE FALLING OR STAYING ASLEEP OR SLEEPING TOO MUCH: NOT AT ALL
SUM OF ALL RESPONSES TO PHQ9 QUESTIONS 1 AND 2: 0
1. LITTLE INTEREST OR PLEASURE IN DOING THINGS: NOT AT ALL
SUM OF ALL RESPONSES TO PHQ QUESTIONS 1-9: 0

## 2025-06-24 NOTE — PROGRESS NOTES
"Subjective:     History of Present Illness  The patient presents for an annual physical exam.    He has not undergone any laboratory tests recently. He reports naturally elevated cholesterol levels and no family history of cardiac disease or myocardial infarction. His last CT calcium score was conducted over 5 years ago. He has been off testosterone therapy for several years.    He is experiencing pain in his left foot, which he attributes to plantar fasciitis. The pain is particularly noticeable upon waking up and is exacerbated by walking. The discomfort is localized to the heel and has been present for approximately 2 months. He has not attempted to alleviate the pain with ice.    He has an upcoming appointment with a dermatologist tomorrow. He has noticed a recurrent bump on his head, which he tends to pick at, causing it to reappear. Additionally, he suspects the presence of a few warts on his scrotum. He has previously sought treatment from the [facility] in [place] for skin tags located between his legs.    FAMILY HISTORY  He reports no family history of heart disease or heart attack.      Current medicines (including changes today)  Current Medications[1]  He  has a past medical history of Chickenpox.    He has no past medical history of [place] measles.    ROS   No chest pain, no shortness of breath, no abdominal pain  Positive ROS as per HPI.  All other systems reviewed and are negative.     Objective:     /60 (BP Location: Right arm, Patient Position: Sitting, BP Cuff Size: Adult)   Pulse 64   Temp 36.5 °C (97.7 °F) (Temporal)   Ht 1.72 m (5' 7.72\")   Wt 93.4 kg (206 lb)   SpO2 97%  Body mass index is 31.58 kg/m².   Physical Exam  Musculoskeletal: Pain at the left heel consistent with plantar fasciitis.  Skin: No abnormalities observed on the scalp.  Constitutional: Alert, no distress.  Skin: Warm, dry, good turgor, no rashes in visible areas.  Eye: Equal, round " and reactive, conjunctiva clear, lids normal.  ENMT: Lips without lesions, good dentition, oropharynx clear.  Neck: Trachea midline, no masses, no thyromegaly.  Psych: Alert and oriented x3, normal affect and mood.      Results          Assessment and Plan:   The following treatment plan was discussed    Assessment & Plan  1.  Annual physical.  - Cholesterol levels have been elevated in the past.  - Comprehensive laboratory tests ordered: CBC, liver function, kidney function, cholesterol, A1c, thyroid level, and PSA.  - CT cardiac score ordered to assess for plaque formation; avoid coffee and antihistamines 4 hours prior.  - Previous CT cardiac score done in 2019; repeat every 5 years.  - Advised to perform stretching exercises, apply ice, and take ibuprofen 600 mg 2-3 times daily for 5-6 days.  - Ensure proper shoe support.  - Conservative measures to be followed for 2 weeks; if no improvement, referral to podiatry will be considered.  - Referral to podiatry initiated; expected contact within 10 business days.  - I will contact [facility] to see if they take [employer].  If they do I will refer him to them.      Follow-up  - Follow-up scheduled in 1 year.      ORDERS:  1. Annual physical exam (Primary)    - CBC WITHOUT DIFFERENTIAL; Future  - Comp Metabolic Panel; Future  - Lipid Profile; Future  - PROSTATE SPECIFIC AG SCREENING; Future  - HEMOGLOBIN A1C; Future  - TSH WITH REFLEX TO FT4; Future    2. Encounter for screening prostate specific antigen (PSA) measurement    - PROSTATE SPECIFIC AG SCREENING; Future    3. Mixed hyperlipidemia    - CT-CARDIAC SCORING; Future    4. Plantar fasciitis, left    - Referral to Podiatry        Please note that this dictation was created using voice recognition software. I have made every reasonable attempt to correct obvious errors, but I expect that there are errors of grammar and possibly content that I did not discover before finalizing the note.       Attestation      Verbal consent was acquired by the patient to use [employer] ambient listening note generation during this visit Yes                  [1]   No current outpatient medications on file.     No current facility-administered medications for this visit.

## 2025-06-24 NOTE — ADDENDUM NOTE
Addended by: MICHELLE RAM on: 6/24/2025 04:50 PM     Modules accepted: Orders     same name as above

## 2025-06-25 ENCOUNTER — APPOINTMENT (OUTPATIENT)
Dept: DERMATOLOGY | Facility: IMAGING CENTER | Age: 45
End: 2025-06-25
Payer: COMMERCIAL

## 2025-06-30 PROBLEM — R73.03 PREDIABETES: Status: ACTIVE | Noted: 2025-06-30

## 2025-07-03 ENCOUNTER — HOSPITAL ENCOUNTER (OUTPATIENT)
Dept: RADIOLOGY | Facility: MEDICAL CENTER | Age: 45
End: 2025-07-03
Attending: PHYSICIAN ASSISTANT
Payer: COMMERCIAL

## 2025-07-03 DIAGNOSIS — E78.2 MIXED HYPERLIPIDEMIA: ICD-10-CM

## 2025-07-03 PROCEDURE — 4410556 CT-CARDIAC SCORING (SELF PAY ONLY)
